# Patient Record
Sex: FEMALE | Race: WHITE | NOT HISPANIC OR LATINO | URBAN - METROPOLITAN AREA
[De-identification: names, ages, dates, MRNs, and addresses within clinical notes are randomized per-mention and may not be internally consistent; named-entity substitution may affect disease eponyms.]

---

## 2018-04-10 ENCOUNTER — INPATIENT (INPATIENT)
Facility: HOSPITAL | Age: 83
LOS: 2 days | Discharge: HOME | DRG: 191 | End: 2018-04-13
Attending: INTERNAL MEDICINE | Admitting: INTERNAL MEDICINE
Payer: MEDICARE

## 2018-04-10 VITALS
HEART RATE: 66 BPM | TEMPERATURE: 97 F | DIASTOLIC BLOOD PRESSURE: 53 MMHG | SYSTOLIC BLOOD PRESSURE: 74 MMHG | OXYGEN SATURATION: 96 % | HEIGHT: 62 IN | RESPIRATION RATE: 15 BRPM | WEIGHT: 139.99 LBS

## 2018-04-10 LAB
ALBUMIN SERPL ELPH-MCNC: 2.8 G/DL — LOW (ref 3.5–5)
ALP SERPL-CCNC: 52 U/L — SIGNIFICANT CHANGE UP (ref 40–120)
ALT FLD-CCNC: 25 U/L DA — SIGNIFICANT CHANGE UP (ref 10–60)
ANION GAP SERPL CALC-SCNC: 7 MMOL/L — SIGNIFICANT CHANGE UP (ref 5–17)
AST SERPL-CCNC: 21 U/L — SIGNIFICANT CHANGE UP (ref 10–40)
BASOPHILS # BLD AUTO: 0.1 K/UL — SIGNIFICANT CHANGE UP (ref 0–0.2)
BASOPHILS NFR BLD AUTO: 1.1 % — SIGNIFICANT CHANGE UP (ref 0–2)
BILIRUB SERPL-MCNC: 0.3 MG/DL — SIGNIFICANT CHANGE UP (ref 0.2–1.2)
BUN SERPL-MCNC: 26 MG/DL — HIGH (ref 7–18)
CALCIUM SERPL-MCNC: 8.3 MG/DL — LOW (ref 8.4–10.5)
CHLORIDE SERPL-SCNC: 106 MMOL/L — SIGNIFICANT CHANGE UP (ref 96–108)
CO2 SERPL-SCNC: 25 MMOL/L — SIGNIFICANT CHANGE UP (ref 22–31)
CREAT SERPL-MCNC: 1.54 MG/DL — HIGH (ref 0.5–1.3)
EOSINOPHIL # BLD AUTO: 0.1 K/UL — SIGNIFICANT CHANGE UP (ref 0–0.5)
EOSINOPHIL NFR BLD AUTO: 1.5 % — SIGNIFICANT CHANGE UP (ref 0–6)
GLUCOSE SERPL-MCNC: 96 MG/DL — SIGNIFICANT CHANGE UP (ref 70–99)
HCT VFR BLD CALC: 38.7 % — SIGNIFICANT CHANGE UP (ref 34.5–45)
HGB BLD-MCNC: 12.1 G/DL — SIGNIFICANT CHANGE UP (ref 11.5–15.5)
LYMPHOCYTES # BLD AUTO: 1.6 K/UL — SIGNIFICANT CHANGE UP (ref 1–3.3)
LYMPHOCYTES # BLD AUTO: 17.6 % — SIGNIFICANT CHANGE UP (ref 13–44)
MCHC RBC-ENTMCNC: 30.4 PG — SIGNIFICANT CHANGE UP (ref 27–34)
MCHC RBC-ENTMCNC: 31.2 GM/DL — LOW (ref 32–36)
MCV RBC AUTO: 97.4 FL — SIGNIFICANT CHANGE UP (ref 80–100)
MONOCYTES # BLD AUTO: 0.6 K/UL — SIGNIFICANT CHANGE UP (ref 0–0.9)
MONOCYTES NFR BLD AUTO: 6.4 % — SIGNIFICANT CHANGE UP (ref 2–14)
NEUTROPHILS # BLD AUTO: 6.5 K/UL — SIGNIFICANT CHANGE UP (ref 1.8–7.4)
NEUTROPHILS NFR BLD AUTO: 73.3 % — SIGNIFICANT CHANGE UP (ref 43–77)
PLATELET # BLD AUTO: 185 K/UL — SIGNIFICANT CHANGE UP (ref 150–400)
POTASSIUM SERPL-MCNC: 5 MMOL/L — SIGNIFICANT CHANGE UP (ref 3.5–5.3)
POTASSIUM SERPL-SCNC: 5 MMOL/L — SIGNIFICANT CHANGE UP (ref 3.5–5.3)
PROT SERPL-MCNC: 6.1 G/DL — SIGNIFICANT CHANGE UP (ref 6–8.3)
RBC # BLD: 3.98 M/UL — SIGNIFICANT CHANGE UP (ref 3.8–5.2)
RBC # FLD: 15.5 % — HIGH (ref 10.3–14.5)
SODIUM SERPL-SCNC: 138 MMOL/L — SIGNIFICANT CHANGE UP (ref 135–145)
TROPONIN I SERPL-MCNC: <0.015 NG/ML — SIGNIFICANT CHANGE UP (ref 0–0.04)
WBC # BLD: 8.8 K/UL — SIGNIFICANT CHANGE UP (ref 3.8–10.5)
WBC # FLD AUTO: 8.8 K/UL — SIGNIFICANT CHANGE UP (ref 3.8–10.5)

## 2018-04-10 PROCEDURE — 70450 CT HEAD/BRAIN W/O DYE: CPT | Mod: 26

## 2018-04-10 RX ORDER — IPRATROPIUM/ALBUTEROL SULFATE 18-103MCG
3 AEROSOL WITH ADAPTER (GRAM) INHALATION ONCE
Qty: 0 | Refills: 0 | Status: COMPLETED | OUTPATIENT
Start: 2018-04-10 | End: 2018-04-10

## 2018-04-10 RX ORDER — SODIUM CHLORIDE 9 MG/ML
1000 INJECTION INTRAMUSCULAR; INTRAVENOUS; SUBCUTANEOUS ONCE
Qty: 0 | Refills: 0 | Status: COMPLETED | OUTPATIENT
Start: 2018-04-10 | End: 2018-04-10

## 2018-04-10 RX ADMIN — Medication 3 MILLILITER(S): at 23:46

## 2018-04-10 RX ADMIN — SODIUM CHLORIDE 1000 MILLILITER(S): 9 INJECTION INTRAMUSCULAR; INTRAVENOUS; SUBCUTANEOUS at 22:06

## 2018-04-10 NOTE — ED PROVIDER NOTE - MEDICAL DECISION MAKING DETAILS
89 y/o F pt presents from Opthalmologist for syncope and low bp s/p right eye beta blocker eye drop; will get CT brain, EKG, blood work, IV fluid hydration and reassess

## 2018-04-10 NOTE — ED PROVIDER NOTE - OBJECTIVE STATEMENT
89 y/o F pt w/ significant PMHx of COPD and significant PSHx of cataract surgery presents to ED s/p episode of unresponsiveness x earlier today. Pt's family reports that pt was at the retinologist office and became unresponsive after cortisone injection to right eye. Per pt family, pt was given eye drops containing beta blockers due to increasing eye pressure s/p injection. Pt's bp was measured at the retinologist office and was noted to be low. Pt's family also reports pt exhibited shallow breathing and labored respiration. EMS was called and per pt nursing aid, pt was given oxygen that appears to have helped sxs. Pt's nursing aid also reports pt had difficulty lifting right leg earlier today x 2:15 PM and returned to normal at 3:00PM. Pt's nursing aid reports a near fall today but was caught by the nursing aid. Pt's family also reports mild stomach discomfort that the family attributes to celebrex use. Pt denies fever, chills, lethargy, diarrhea, vomiting, localized pain, or any other complaints.

## 2018-04-10 NOTE — ED PROVIDER NOTE - CHPI ED SYMPTOMS POS
SHORTNESS OF BREATH/low BP, unresponsiveness, increased right eye pressure, right eye dilation, shallowed and labored breathing, difficulty moving right leg,

## 2018-04-10 NOTE — ED ADULT TRIAGE NOTE - CHIEF COMPLAINT QUOTE
BIBA from ophthalmologist office with c/o syncopal episode . As per EMS patient was sitting on the chair after getting eye drops when syncope occur. Denies fall, denies injury.

## 2018-04-10 NOTE — ED PROVIDER NOTE - PROGRESS NOTE DETAILS
Pt with syncope and low BP but gradually improved here with IVF.  Suspect likely vasovagal episode.  However Pt also with intermittent shortness of breath recently. Pt with syncope and low BP but gradually improved here with IVF.  Suspect likely vasovagal episode.  However Pt also with intermittent shortness of breath recently, and while here in ED her pulse oximetry decreased consistently into mid-80's% on room air.  She required nasal cannula O2 at 2-3 L to maintain pulse ox above the 90% range.  Exam revealed no wheezing or respiratory distress, no chest pain.  Pt does use nebulizer treatments at home but does not have home oxygen.  -Informed Dr. DEVEN Dove for admission, unattached as she currently has no PMD. Pt's additional medications include:   Gabapentin 300 mg BID  Tramadol/APAP 37.5/325 BID  Prednisone 5 mg daily

## 2018-04-11 DIAGNOSIS — J44.1 CHRONIC OBSTRUCTIVE PULMONARY DISEASE WITH (ACUTE) EXACERBATION: ICD-10-CM

## 2018-04-11 DIAGNOSIS — Z98.49 CATARACT EXTRACTION STATUS, UNSPECIFIED EYE: Chronic | ICD-10-CM

## 2018-04-11 DIAGNOSIS — N17.9 ACUTE KIDNEY FAILURE, UNSPECIFIED: ICD-10-CM

## 2018-04-11 DIAGNOSIS — R55 SYNCOPE AND COLLAPSE: ICD-10-CM

## 2018-04-11 DIAGNOSIS — Z29.9 ENCOUNTER FOR PROPHYLACTIC MEASURES, UNSPECIFIED: ICD-10-CM

## 2018-04-11 LAB
ANION GAP SERPL CALC-SCNC: 6 MMOL/L — SIGNIFICANT CHANGE UP (ref 5–17)
BUN SERPL-MCNC: 20 MG/DL — HIGH (ref 7–18)
CALCIUM SERPL-MCNC: 8.8 MG/DL — SIGNIFICANT CHANGE UP (ref 8.4–10.5)
CHLORIDE SERPL-SCNC: 110 MMOL/L — HIGH (ref 96–108)
CO2 SERPL-SCNC: 28 MMOL/L — SIGNIFICANT CHANGE UP (ref 22–31)
CREAT SERPL-MCNC: 1.04 MG/DL — SIGNIFICANT CHANGE UP (ref 0.5–1.3)
GLUCOSE SERPL-MCNC: 90 MG/DL — SIGNIFICANT CHANGE UP (ref 70–99)
MAGNESIUM SERPL-MCNC: 2.7 MG/DL — HIGH (ref 1.6–2.6)
PHOSPHATE SERPL-MCNC: 3 MG/DL — SIGNIFICANT CHANGE UP (ref 2.5–4.5)
POTASSIUM SERPL-MCNC: 4.1 MMOL/L — SIGNIFICANT CHANGE UP (ref 3.5–5.3)
POTASSIUM SERPL-SCNC: 4.1 MMOL/L — SIGNIFICANT CHANGE UP (ref 3.5–5.3)
SODIUM SERPL-SCNC: 144 MMOL/L — SIGNIFICANT CHANGE UP (ref 135–145)

## 2018-04-11 PROCEDURE — 99222 1ST HOSP IP/OBS MODERATE 55: CPT

## 2018-04-11 PROCEDURE — 99285 EMERGENCY DEPT VISIT HI MDM: CPT | Mod: 25

## 2018-04-11 PROCEDURE — 71045 X-RAY EXAM CHEST 1 VIEW: CPT | Mod: 26

## 2018-04-11 PROCEDURE — 93880 EXTRACRANIAL BILAT STUDY: CPT | Mod: 26

## 2018-04-11 RX ORDER — ALBUTEROL 90 UG/1
0.5 AEROSOL, METERED ORAL
Qty: 0 | Refills: 0 | COMMUNITY

## 2018-04-11 RX ORDER — DOCUSATE SODIUM 100 MG
100 CAPSULE ORAL THREE TIMES A DAY
Qty: 0 | Refills: 0 | Status: DISCONTINUED | OUTPATIENT
Start: 2018-04-11 | End: 2018-04-13

## 2018-04-11 RX ORDER — SODIUM CHLORIDE 9 MG/ML
1000 INJECTION INTRAMUSCULAR; INTRAVENOUS; SUBCUTANEOUS
Qty: 0 | Refills: 0 | Status: DISCONTINUED | OUTPATIENT
Start: 2018-04-11 | End: 2018-04-11

## 2018-04-11 RX ORDER — SODIUM CHLORIDE 9 MG/ML
1000 INJECTION INTRAMUSCULAR; INTRAVENOUS; SUBCUTANEOUS
Qty: 0 | Refills: 0 | Status: COMPLETED | OUTPATIENT
Start: 2018-04-11 | End: 2018-04-11

## 2018-04-11 RX ORDER — GABAPENTIN 400 MG/1
1 CAPSULE ORAL
Qty: 0 | Refills: 0 | COMMUNITY

## 2018-04-11 RX ORDER — IPRATROPIUM/ALBUTEROL SULFATE 18-103MCG
3 AEROSOL WITH ADAPTER (GRAM) INHALATION ONCE
Qty: 0 | Refills: 0 | Status: COMPLETED | OUTPATIENT
Start: 2018-04-11 | End: 2018-04-11

## 2018-04-11 RX ORDER — METHOTREXATE 2.5 MG/1
1 TABLET ORAL
Qty: 0 | Refills: 0 | COMMUNITY

## 2018-04-11 RX ORDER — BIMATOPROST 0.3 MG/ML
1 SOLUTION/ DROPS OPHTHALMIC
Qty: 0 | Refills: 0 | COMMUNITY

## 2018-04-11 RX ORDER — GABAPENTIN 400 MG/1
300 CAPSULE ORAL
Qty: 0 | Refills: 0 | Status: DISCONTINUED | OUTPATIENT
Start: 2018-04-11 | End: 2018-04-13

## 2018-04-11 RX ORDER — SENNA PLUS 8.6 MG/1
2 TABLET ORAL AT BEDTIME
Qty: 0 | Refills: 0 | Status: DISCONTINUED | OUTPATIENT
Start: 2018-04-11 | End: 2018-04-13

## 2018-04-11 RX ORDER — LATANOPROST 0.05 MG/ML
1 SOLUTION/ DROPS OPHTHALMIC; TOPICAL AT BEDTIME
Qty: 0 | Refills: 0 | Status: DISCONTINUED | OUTPATIENT
Start: 2018-04-11 | End: 2018-04-13

## 2018-04-11 RX ORDER — HEPARIN SODIUM 5000 [USP'U]/ML
5000 INJECTION INTRAVENOUS; SUBCUTANEOUS EVERY 8 HOURS
Qty: 0 | Refills: 0 | Status: DISCONTINUED | OUTPATIENT
Start: 2018-04-11 | End: 2018-04-13

## 2018-04-11 RX ORDER — SERTRALINE 25 MG/1
150 TABLET, FILM COATED ORAL DAILY
Qty: 0 | Refills: 0 | Status: DISCONTINUED | OUTPATIENT
Start: 2018-04-11 | End: 2018-04-13

## 2018-04-11 RX ORDER — TRAMADOL HYDROCHLORIDE 50 MG/1
25 TABLET ORAL ONCE
Qty: 0 | Refills: 0 | Status: DISCONTINUED | OUTPATIENT
Start: 2018-04-11 | End: 2018-04-11

## 2018-04-11 RX ORDER — UMECLIDINIUM BROMIDE AND VILANTEROL TRIFENATATE 62.5; 25 UG/1; UG/1
1 POWDER RESPIRATORY (INHALATION)
Qty: 0 | Refills: 0 | COMMUNITY

## 2018-04-11 RX ORDER — CELECOXIB 200 MG/1
2 CAPSULE ORAL
Qty: 0 | Refills: 0 | COMMUNITY

## 2018-04-11 RX ORDER — SERTRALINE 25 MG/1
150 TABLET, FILM COATED ORAL
Qty: 0 | Refills: 0 | COMMUNITY

## 2018-04-11 RX ORDER — TRAMADOL HYDROCHLORIDE 50 MG/1
25 TABLET ORAL
Qty: 0 | Refills: 0 | Status: DISCONTINUED | OUTPATIENT
Start: 2018-04-11 | End: 2018-04-13

## 2018-04-11 RX ORDER — METHOTREXATE 2.5 MG/1
15 TABLET ORAL
Qty: 0 | Refills: 0 | Status: COMPLETED | OUTPATIENT
Start: 2018-04-11 | End: 2018-04-12

## 2018-04-11 RX ORDER — IPRATROPIUM/ALBUTEROL SULFATE 18-103MCG
3 AEROSOL WITH ADAPTER (GRAM) INHALATION EVERY 6 HOURS
Qty: 0 | Refills: 0 | Status: DISCONTINUED | OUTPATIENT
Start: 2018-04-11 | End: 2018-04-13

## 2018-04-11 RX ADMIN — GABAPENTIN 300 MILLIGRAM(S): 400 CAPSULE ORAL at 21:24

## 2018-04-11 RX ADMIN — HEPARIN SODIUM 5000 UNIT(S): 5000 INJECTION INTRAVENOUS; SUBCUTANEOUS at 21:24

## 2018-04-11 RX ADMIN — SENNA PLUS 2 TABLET(S): 8.6 TABLET ORAL at 21:24

## 2018-04-11 RX ADMIN — Medication 3 MILLILITER(S): at 01:25

## 2018-04-11 RX ADMIN — Medication 100 MILLIGRAM(S): at 05:07

## 2018-04-11 RX ADMIN — Medication 30 MILLILITER(S): at 09:47

## 2018-04-11 RX ADMIN — Medication 100 MILLIGRAM(S): at 21:24

## 2018-04-11 RX ADMIN — TRAMADOL HYDROCHLORIDE 25 MILLIGRAM(S): 50 TABLET ORAL at 10:56

## 2018-04-11 RX ADMIN — SODIUM CHLORIDE 75 MILLILITER(S): 9 INJECTION INTRAMUSCULAR; INTRAVENOUS; SUBCUTANEOUS at 08:04

## 2018-04-11 RX ADMIN — Medication 125 MILLIGRAM(S): at 01:05

## 2018-04-11 RX ADMIN — Medication 30 MILLILITER(S): at 16:06

## 2018-04-11 RX ADMIN — HEPARIN SODIUM 5000 UNIT(S): 5000 INJECTION INTRAVENOUS; SUBCUTANEOUS at 14:23

## 2018-04-11 RX ADMIN — SERTRALINE 150 MILLIGRAM(S): 25 TABLET, FILM COATED ORAL at 11:05

## 2018-04-11 RX ADMIN — LATANOPROST 1 DROP(S): 0.05 SOLUTION/ DROPS OPHTHALMIC; TOPICAL at 21:24

## 2018-04-11 RX ADMIN — SODIUM CHLORIDE 75 MILLILITER(S): 9 INJECTION INTRAMUSCULAR; INTRAVENOUS; SUBCUTANEOUS at 05:07

## 2018-04-11 RX ADMIN — HEPARIN SODIUM 5000 UNIT(S): 5000 INJECTION INTRAVENOUS; SUBCUTANEOUS at 05:07

## 2018-04-11 RX ADMIN — TRAMADOL HYDROCHLORIDE 25 MILLIGRAM(S): 50 TABLET ORAL at 11:21

## 2018-04-11 NOTE — ED ADULT NURSE REASSESSMENT NOTE - NS ED NURSE REASSESS COMMENT FT1
Patient remains hemodynamically stable and in no acute distress, able to speak in full sentences with no medical complaints.  Family member by bedside.

## 2018-04-11 NOTE — CONSULT NOTE ADULT - SUBJECTIVE AND OBJECTIVE BOX
CHIEF COMPLAINT: Patient is a 89y old  Female who presents with a chief complaint of syncope (11 Apr 2018 03:02)      HPI:  Patient is a 89 yo female from home with PMH of COPD and PSH of cataract surgery presents to ED s/p one episode of unresponsiveness for couple minutes yesterday. Pt's family reports that pt was at the Retinologist office for macular degeneration and became unresponsive after cortisone injection to right eye. Pt was given eye drops containing beta blockers due to increasing eye pressure s/p injection. Pt denied any dizziness, room spinning sensation or falls. Pt's BP was measured at the Retinologist office and was noted to be low. Pt's family also reports pt exhibited shallow breathing and labored respiration. EMS was called and per pt nursing aid, pt was given oxygen that appears to have helped. Pt's nursing aid reports a near fall yesterday at home but was caught by the nursing aid. Pt denies any fever, chills, lethargy, headache, chest pain, palpitation, abd pain , diarrhea, nausea,  vomiting,  or any other complaints.    In ER, vitals wnl except BP 74/53mmHg, given 1 liter NS bolus, BP up to 112/68, s/p Duoneb x2 , solumedrol 125 mg IV in ER. Labs wnl except BUN / Cr 26/1.54, started NS IV fluid. EKG NSR , low voltage QRS, no ST or T wave changes. CT head No acute intracranial hemorrhage, mass effect, or evidence of acute vascular territorial infarction. CXR unremarkable, will f/u official report. (11 Apr 2018 03:02)   Patient seen and examined.     PAST MEDICAL & SURGICAL HISTORY:  COPD (chronic obstructive pulmonary disease)  History of cataract surgery      Allergies    No Known Allergies    Intolerances        MEDICATIONS  (STANDING):  docusate sodium 100 milliGRAM(s) Oral three times a day  gabapentin 300 milliGRAM(s) Oral two times a day  heparin  Injectable 5000 Unit(s) SubCutaneous every 8 hours  latanoprost 0.005% Ophthalmic Solution 1 Drop(s) Both EYES at bedtime  methotrexate 15 milliGRAM(s) Oral <User Schedule>  predniSONE   Tablet 5 milliGRAM(s) Oral daily  senna 2 Tablet(s) Oral at bedtime  sertraline 150 milliGRAM(s) Oral daily  traMADol 25 milliGRAM(s) Oral two times a day      MEDICATIONS  (PRN):  ALBUTerol/ipratropium for Nebulization 3 milliLiter(s) Nebulizer every 6 hours PRN Shortness of Breath  aluminum hydroxide/magnesium hydroxide/simethicone Suspension 30 milliLiter(s) Oral every 6 hours PRN Dyspepsia   Medications up to date at time of exam.    FAMILY HISTORY:  No pertinent family history in first degree relatives      SOCIAL HISTORY  Smoking History: [   ] smoking/smoke exposure, [ x  ] former smoker 1 PPD x 40 years, quit 20 years ago, [  ] denies smoking  Living Condition: [   ] apartment, [   ] private house  Work History:   Travel History: denies recent travel  Illicit Substance Use: denies  Alcohol Use: denies    REVIEW OF SYSTEMS:    CONSTITUTIONAL:  denies fevers, chills, sweats, weight loss    HEENT:  denies diplopia or blurred vision, sore throat or runny nose.    CARDIOVASCULAR:  denies pressure, squeezing, tightness, or heaviness about the chest; no palpitations.    RESPIRATORY:  denies SOB, cough, STUBBS, wheezing.    GASTROINTESTINAL:  denies abdominal pain, nausea, vomiting or diarrhea.    GENITOURINARY: denies dysuria, frequency or urgency.    NEUROLOGIC:  denies numbness, tingling, seizures or weakness.    PSYCHIATRIC:  denies disorder of thought or mood.    MSK: denies swelling, redness      PHYSICAL EXAMINATION:    GENERAL: The patient is a well-developed, well-nourished, in no apparent distress.     Vital Signs Last 24 Hrs  T(C): 36.6 (11 Apr 2018 14:33), Max: 37.1 (11 Apr 2018 12:29)  T(F): 97.8 (11 Apr 2018 14:33), Max: 98.7 (11 Apr 2018 12:29)  HR: 82 (11 Apr 2018 14:33) (62 - 97)  BP: 155/81 (11 Apr 2018 14:33) (74/53 - 198/111)  BP(mean): --  RR: 16 (11 Apr 2018 14:33) (15 - 16)  SpO2: 92% (11 Apr 2018 14:33) (92% - 97%)   (if applicable)    Chest Tube (if applicable)    HEENT: Head is normocephalic and atraumatic. .    NECK: Supple, no palpable adenopathy.    LUNGS: Clear to auscultation, no wheezing, rales, or rhonchi.    HEART: Regular rate and rhythm without murmur.    ABDOMEN: Soft, nontender, and nondistended.  No hepatosplenomegaly is noted.    EXTREMITIES: Without any cyanosis, clubbing, rash, lesions or edema.    NEUROLOGIC: Awake, alert.    SKIN: Warm, dry, good turgor.      LABS:                        12.1   8.8   )-----------( 185      ( 10 Apr 2018 21:18 )             38.7     04-11    144  |  110<H>  |  20<H>  ----------------------------<  90  4.1   |  28  |  1.04    Ca    8.8      11 Apr 2018 16:04  Phos  3.0     04-11  Mg     2.7     04-11    TPro  6.1  /  Alb  2.8<L>  /  TBili  0.3  /  DBili  x   /  AST  21  /  ALT  25  /  AlkPhos  52  04-10          CARDIAC MARKERS ( 10 Apr 2018 21:18 )  <0.015 ng/mL / x     / x     / x     / x                    MICROBIOLOGY: (if applicable)    RADIOLOGY & ADDITIONAL STUDIES:  EKG:   CXR:  ECHO:    IMPRESSION: 89y Female PAST MEDICAL & SURGICAL HISTORY:  COPD (chronic obstructive pulmonary disease)  History of cataract surgery       p/w SOB, unresponsiveness from retinologist office after injection of xalantan into eye.  As per daughter Dora, patient was told she has emphysema and recently pt desat to 79% w/ physical therapy.     + [facility]    RECOMMENDATIONS:     - con't with bronchodilators, steroids, o2 supplement as needed.    - patient may need home o2, document o2 sat w/ and w/out o2, at rest and activity.   - neuro f/u    - DVT and GI prophylaxis.    - plan of care discussed with daughter dora at bedside CHIEF COMPLAINT: Patient is a 89y old  Female who presents with a chief complaint of syncope (11 Apr 2018 03:02)      HPI:  Patient is a 89 yo female from home with PMH of COPD and PSH of cataract surgery presents to ED s/p one episode of unresponsiveness for couple minutes yesterday. Pt's family reports that pt was at the Retinologist office for macular degeneration and became unresponsive after cortisone injection to right eye. Pt was given eye drops containing beta blockers due to increasing eye pressure s/p injection. Pt denied any dizziness, room spinning sensation or falls. Pt's BP was measured at the Retinologist office and was noted to be low. Pt's family also reports pt exhibited shallow breathing and labored respiration. EMS was called and per pt nursing aid, pt was given oxygen that appears to have helped. Pt's nursing aid reports a near fall yesterday at home but was caught by the nursing aid. Pt denies any fever, chills, lethargy, headache, chest pain, palpitation, abd pain , diarrhea, nausea,  vomiting,  or any other complaints.    In ER, vitals wnl except BP 74/53mmHg, given 1 liter NS bolus, BP up to 112/68, s/p Duoneb x2 , solumedrol 125 mg IV in ER. Labs wnl except BUN / Cr 26/1.54, started NS IV fluid. EKG NSR , low voltage QRS, no ST or T wave changes. CT head No acute intracranial hemorrhage, mass effect, or evidence of acute vascular territorial infarction. CXR unremarkable, will f/u official report. (11 Apr 2018 03:02)   Patient seen and examined.     PAST MEDICAL & SURGICAL HISTORY:  COPD (chronic obstructive pulmonary disease)  History of cataract surgery      Allergies    No Known Allergies    Intolerances        MEDICATIONS  (STANDING):  docusate sodium 100 milliGRAM(s) Oral three times a day  gabapentin 300 milliGRAM(s) Oral two times a day  heparin  Injectable 5000 Unit(s) SubCutaneous every 8 hours  latanoprost 0.005% Ophthalmic Solution 1 Drop(s) Both EYES at bedtime  methotrexate 15 milliGRAM(s) Oral <User Schedule>  predniSONE   Tablet 5 milliGRAM(s) Oral daily  senna 2 Tablet(s) Oral at bedtime  sertraline 150 milliGRAM(s) Oral daily  traMADol 25 milliGRAM(s) Oral two times a day      MEDICATIONS  (PRN):  ALBUTerol/ipratropium for Nebulization 3 milliLiter(s) Nebulizer every 6 hours PRN Shortness of Breath  aluminum hydroxide/magnesium hydroxide/simethicone Suspension 30 milliLiter(s) Oral every 6 hours PRN Dyspepsia   Medications up to date at time of exam.    FAMILY HISTORY:  No pertinent family history in first degree relatives      SOCIAL HISTORY  Smoking History: [   ] smoking/smoke exposure, [ x  ] former smoker 1 PPD x 40 years, quit 20 years ago, [  ] denies smoking  Living Condition: [   ] apartment, [   ] private house  Work History:   Travel History: denies recent travel  Illicit Substance Use: denies  Alcohol Use: denies    REVIEW OF SYSTEMS:    CONSTITUTIONAL:  denies fevers, chills, sweats, weight loss    HEENT:  denies diplopia or blurred vision, sore throat or runny nose.    CARDIOVASCULAR:  denies pressure, squeezing, tightness, or heaviness about the chest; no palpitations.    RESPIRATORY:  denies SOB, cough, STUBBS, wheezing.    GASTROINTESTINAL:  denies abdominal pain, nausea, vomiting or diarrhea.    GENITOURINARY: denies dysuria, frequency or urgency.    NEUROLOGIC:  denies numbness, tingling, seizures or weakness.    PSYCHIATRIC:  denies disorder of thought or mood.    MSK: denies swelling, redness      PHYSICAL EXAMINATION:    GENERAL: The patient is a well-developed, well-nourished, in no apparent distress.     Vital Signs Last 24 Hrs  T(C): 36.6 (11 Apr 2018 14:33), Max: 37.1 (11 Apr 2018 12:29)  T(F): 97.8 (11 Apr 2018 14:33), Max: 98.7 (11 Apr 2018 12:29)  HR: 82 (11 Apr 2018 14:33) (62 - 97)  BP: 155/81 (11 Apr 2018 14:33) (74/53 - 198/111)  BP(mean): --  RR: 16 (11 Apr 2018 14:33) (15 - 16)  SpO2: 92% (11 Apr 2018 14:33) (92% - 97%)   (if applicable)    Chest Tube (if applicable)    HEENT: Head is normocephalic and atraumatic. .    NECK: Supple, no palpable adenopathy.    LUNGS: Clear to auscultation, no wheezing, rales, or rhonchi.    HEART: Regular rate and rhythm without murmur.    ABDOMEN: Soft, nontender, and nondistended.  No hepatosplenomegaly is noted.    EXTREMITIES: Without any cyanosis, clubbing, rash, lesions or edema.    NEUROLOGIC: Awake, alert.    SKIN: Warm, dry, good turgor.      LABS:                        12.1   8.8   )-----------( 185      ( 10 Apr 2018 21:18 )             38.7     04-11    144  |  110<H>  |  20<H>  ----------------------------<  90  4.1   |  28  |  1.04    Ca    8.8      11 Apr 2018 16:04  Phos  3.0     04-11  Mg     2.7     04-11    TPro  6.1  /  Alb  2.8<L>  /  TBili  0.3  /  DBili  x   /  AST  21  /  ALT  25  /  AlkPhos  52  04-10          CARDIAC MARKERS ( 10 Apr 2018 21:18 )  <0.015 ng/mL / x     / x     / x     / x                    MICROBIOLOGY: (if applicable)    RADIOLOGY & ADDITIONAL STUDIES:  EKG:   CXR:  ECHO:    IMPRESSION: 89y Female PAST MEDICAL & SURGICAL HISTORY:  COPD (chronic obstructive pulmonary disease)  History of cataract surgery       p/w SOB, unresponsiveness from retinologist office after injection of xalantan into eye.  As per daughter Dora, patient was told she has emphysema and recently pt desat to 79% w/ physical therapy.     + [facility]    RECOMMENDATIONS:     - con't with bronchodilators, steroids, o2 supplement as needed.    - patient may need home o2, document o2 sat w/ and w/out o2, at rest and activity.   - neuro f/u    - DVT and GI prophylaxis.    - plan of care discussed with sebastián turcios at bedside  Agree with above assessment and plan as transcribed.

## 2018-04-11 NOTE — CONSULT NOTE ADULT - ATTENDING COMMENTS
Thank you for the courtesy of the consultation,I would be available for any further discussion if needed.  Juancho Estrella MD,FACC.  [address] [place], [place] [id number] [phone]

## 2018-04-11 NOTE — CONSULT NOTE ADULT - ASSESSMENT
Syncope LOC concerning for syncope, unlikely to be neurological.  Eval for syncope per primary team.  Will recommend MRI brain and EEG to r/o stroke/ seizures as patient has chronic word finding difficulty and dragging her right leg.  Please call back if these tests are abnormal.

## 2018-04-11 NOTE — PHYSICAL THERAPY INITIAL EVALUATION ADULT - DIAGNOSIS, PT EVAL
Patient presented with decreased endurance, impaired balance, decreased spo2 and was unsteady with transfers and ambulation

## 2018-04-11 NOTE — H&P ADULT - PROBLEM SELECTOR PLAN 3
former smoker, pt take albuterol inhaler at home as needed, pt had SOB/labored breathing as per family  s/p Duoneb x2 , solumedrol 125 mg IV in ER former smoker, pt take albuterol inhaler at home as needed, pt had SOB/labored breathing as per family  s/p Duoneb x2 , solumedrol 125 mg IV in ER  continue o2 , nebs PRN

## 2018-04-11 NOTE — H&P ADULT - NEUROLOGICAL DETAILS
sensation intact/responds to pain/normal strength/responds to verbal commands/alert and oriented x 3

## 2018-04-11 NOTE — H&P ADULT - PROBLEM SELECTOR PLAN 2
BUN / Cr 26/1.54, unknown baseline , likely 2/2 dehydration vs hypotension    started NS IV fluid at 75cc  s/p NS 1 liter  f/u urine lytes  monitor BMP closely

## 2018-04-11 NOTE — H&P ADULT - HISTORY OF PRESENT ILLNESS
Patient is a 89 yo female from home with PMH of COPD and PSH of cataract surgery presents to ED s/p one episode of unresponsiveness for couple minutes yesterday. Pt's family reports that pt was at the Retinologist office and became unresponsive after cortisone injection to right eye. Pt was given eye drops containing beta blockers due to increasing eye pressure s/p injection. Pt denied any dizziness, room spinning sensation. Pt's BP was measured at the Retinologist office and was noted to be low. Pt's family also reports pt exhibited shallow breathing and labored respiration. EMS was called and per pt nursing aid, pt was given oxygen that appears to have helped. Pt's nursing aid reports a near fall yesterday at home but was caught by the nursing aid. Pt denies any fever, chills, lethargy, headache, chest pain, palpitation, abd pain , diarrhea, nausea,  vomiting,  or any other complaints.    In ER, vitals wnl except BP 74/53mmHg, given 1 liter NS bolus, BP up to 112/68, s/p Duoneb x2 , solumedrol 125 mg IV in ER. Labs wnl except BUN / Cr 26/1.54, started NS IV fluid. EKG NSR , low voltage QRS, no ST or T wave changes. CT head No acute intracranial hemorrhage, mass effect, or evidence of acute vascular territorial infarction. CXR unremarkable, will f/u official report. Patient is a 89 yo female from home with PMH of COPD and PSH of cataract surgery presents to ED s/p one episode of unresponsiveness for couple minutes yesterday. Pt's family reports that pt was at the Retinologist office and became unresponsive after cortisone injection to right eye. Pt was given eye drops containing beta blockers due to increasing eye pressure s/p injection. Pt denied any dizziness, room spinning sensation or falls. Pt's BP was measured at the Retinologist office and was noted to be low. Pt's family also reports pt exhibited shallow breathing and labored respiration. EMS was called and per pt nursing aid, pt was given oxygen that appears to have helped. Pt's nursing aid reports a near fall yesterday at home but was caught by the nursing aid. Pt denies any fever, chills, lethargy, headache, chest pain, palpitation, abd pain , diarrhea, nausea,  vomiting,  or any other complaints.    In ER, vitals wnl except BP 74/53mmHg, given 1 liter NS bolus, BP up to 112/68, s/p Duoneb x2 , solumedrol 125 mg IV in ER. Labs wnl except BUN / Cr 26/1.54, started NS IV fluid. EKG NSR , low voltage QRS, no ST or T wave changes. CT head No acute intracranial hemorrhage, mass effect, or evidence of acute vascular territorial infarction. CXR unremarkable, will f/u official report. Patient is a 89 yo female from home with PMH of COPD and PSH of cataract surgery presents to ED s/p one episode of unresponsiveness for couple minutes yesterday. Pt's family reports that pt was at the Retinologist office for macular degeneration and became unresponsive after cortisone injection to right eye. Pt was given eye drops containing beta blockers due to increasing eye pressure s/p injection. Pt denied any dizziness, room spinning sensation or falls. Pt's BP was measured at the Retinologist office and was noted to be low. Pt's family also reports pt exhibited shallow breathing and labored respiration. EMS was called and per pt nursing aid, pt was given oxygen that appears to have helped. Pt's nursing aid reports a near fall yesterday at home but was caught by the nursing aid. Pt denies any fever, chills, lethargy, headache, chest pain, palpitation, abd pain , diarrhea, nausea,  vomiting,  or any other complaints.    In ER, vitals wnl except BP 74/53mmHg, given 1 liter NS bolus, BP up to 112/68, s/p Duoneb x2 , solumedrol 125 mg IV in ER. Labs wnl except BUN / Cr 26/1.54, started NS IV fluid. EKG NSR , low voltage QRS, no ST or T wave changes. CT head No acute intracranial hemorrhage, mass effect, or evidence of acute vascular territorial infarction. CXR unremarkable, will f/u official report.

## 2018-04-11 NOTE — CONSULT NOTE ADULT - SUBJECTIVE AND OBJECTIVE BOX
CHIEF COMPLAINT:Syncope    HPI:-Patient is a 89 yo female followed in office for many years, from home with PMH of COPD,Arthritis and PSH of cataract surgery presents to ED s/p one episode of unresponsiveness for couple minutes yesterday. Pt's family reports that pt was at the Retinologist office for macular degeneration and became unresponsive after cortisone injection to right eye. Pt was given eye drops containing beta blockers due to increasing eye pressure s/p injection. Pt denied any dizziness, room spinning sensation or falls. Pt's BP was measured at the Retinologist office and was noted to be low. Pt's family also reports pt exhibited shallow breathing and labored respiration. EMS was called and per pt nursing aid, pt was given oxygen that appears to have helped. Pt's nursing aid reports a near fall yesterday at home but was caught by the nursing aid. Pt denies any fever, chills, lethargy, headache, chest pain, palpitation, abd pain , diarrhea, nausea,  vomiting,  or any other complaints.    In ER, vitals wnl except BP 74/53mmHg, given 1 liter NS bolus, BP up to 112/68, s/p Duoneb x2 , solumedrol 125 mg IV in ER. Labs wnl except BUN / Cr 26/1.54, started NS IV fluid. EKG NSR , low voltage QRS, no ST or T wave changes. CT head No acute intracranial hemorrhage, mass effect, or evidence of acute vascular territorial infarction. CXR unremarkable,she feels better no chest pain no arrhythmias noted,refers to falls at home.          PAST MEDICAL & SURGICAL HISTORY:  COPD (chronic obstructive pulmonary disease)  History of cataract surgery      MEDICATIONS  (STANDING):  docusate sodium 100 milliGRAM(s) Oral three times a day  gabapentin 300 milliGRAM(s) Oral two times a day  heparin  Injectable 5000 Unit(s) SubCutaneous every 8 hours  predniSONE   Tablet 5 milliGRAM(s) Oral daily  senna 2 Tablet(s) Oral at bedtime  sertraline 150 milliGRAM(s) Oral daily    MEDICATIONS  (PRN):  ALBUTerol/ipratropium for Nebulization 3 milliLiter(s) Nebulizer every 6 hours PRN Shortness of Breath  aluminum hydroxide/magnesium hydroxide/simethicone Suspension 30 milliLiter(s) Oral every 6 hours PRN Dyspepsia      FAMILY HISTORY:  No pertinent family history in first degree relatives  No family history of premature coronary artery disease or sudden cardiac death    SOCIAL HISTORY:  Smoking-Former smoker  Alcohol-Denies  Ilicit Drug use-Denies    REVIEW OF SYSTEMS:  Constitutional: [ ] fever, [ ]weight loss, [ ]fatigue Activity [ ] Bedbound,[x ] Ambulates [ ] Unassisted[x ] Cane/Walker [ ] Assistence.  Eyes: [ ] visual changes  Respiratory: [x ]shortness of breath;  [x ] cough, [ ]wheezing, [ ]chills, [ ]hemoptysis  Cardiovascular: [ ] chest pain, [ ]palpitations, [ ]dizziness,  [ ]leg swelling[ ]orthopnea [ ]PND  Gastrointestinal: [ ] abdominal pain, [ ]nausea, [ ]vomiting,  [ ]diarrhea,[ ]constipation  Genitourinary: [ ] dysuria, [ ] hematuria  Neurologic: [ ] headaches [ ] tremors[ ] weakness  Skin: [ ] itching, [ ]burning, [ ] rashes  Endocrine: [ ] heat or cold intolerance  Musculoskeletal: [ ] joint pain or swelling; [x ] muscle, back, or extremity pain  Psychiatric: [ ] depression, [ ]anxiety, [ ]mood swings, or [ ]difficulty sleeping  Hematologic: [ ] easy bruising, [ ] bleeding gums       [ x] All others negative	  [ ] Unable to obtain    Vital Signs Last 24 Hrs  T(C): 36.3 (11 Apr 2018 07:46), Max: 36.9 (10 Apr 2018 23:31)  T(F): 97.3 (11 Apr 2018 07:46), Max: 98.4 (10 Apr 2018 23:31)  HR: 82 (11 Apr 2018 07:46) (62 - 82)  BP: 164/88 (11 Apr 2018 08:08) (74/53 - 177/89) ORTHOSTATIC-NEGATIVE  RR: 16 (11 Apr 2018 07:46) (15 - 16)  SpO2: 97% (11 Apr 2018 07:46) (94% - 97%)  I&O's Summary      PHYSICAL EXAM:  General: No acute distress BMI-25.6  HEENT: EOMI, PERRL[ ] Icteric  Neck: Supple, No JVD  Lungs: Fair air entry bilaterally; [ ] Rales [x ] Rhonchi [ ] Wheezing  Heart: Regular rate and rhythm;[x ] Murmurs-  2 /6 [x ] Systolic [ ] Diastolic [ ] Radiation,No rubs, or gallops  Abdomen: Nontender, bowel sounds present  Extremities: No clubbing, cyanosis, or edema[ ] Calf tenderness  Nervous system:  Alert & Oriented X3, no focal deficits  Psychiatric: Normal affect  Skin: No rashes or lesions      LABS:  04-10    138  |  106  |  26<H>  ----------------------------<  96  5.0   |  25  |  1.54<H>    Ca    8.3<L>      10 Apr 2018 21:18    TPro  6.1  /  Alb  2.8<L>  /  TBili  0.3  /  DBili  x   /  AST  21  /  ALT  25  /  AlkPhos  52  04-10    Creatinine Trend: 1.54<--                        12.1   8.8   )-----------( 185      ( 10 Apr 2018 21:18 )             38.7         Lipid Panel:   Cardiac Enzymes: CARDIAC MARKERS ( 10 Apr 2018 21:18 )  <0.015 ng/mL / x     / x     / x     / x          RADIOLOGY: XR CHEST AP 04/11/2018  IMPRESSION: Mild basilar congestion. No focal infiltrate.    ECG [my interpretation]:Sinus rhythm no acute ST T wave abnormalities.

## 2018-04-11 NOTE — CONSULT NOTE ADULT - ASSESSMENT
· Assessment		  Patient is a 89 yo female from home with PMH of COPD and PSH of cataract surgery presents to ED s/p one episode of unresponsiveness for couple minutes yesterday. In ER, vitals wnl except BP 74/53mmHgCT head No acute intracranial hemorrhage, mass effect, or evidence of acute vascular territorial infarction        Problem/Plan - 1:  ·  Problem: Syncope.  Plan: pt LOC for couple minutes after cortisone injection, likely due to vasovagal vs orthostatic hypotension.  BP stable-resume home BP meds.  Can discontinue telemetry.        Problem/Plan - 2:  ·  Problem: AURORA (acute kidney injury).  Plan: BUN / Cr 26/1.54, unknown baseline , likely 2/2 dehydration vs hypotension.  Follow creatinine          Problem/Plan - 3:  ·  Problem: COPD exacerbation.  Plan: former smoker, pt take albuterol inhaler at home as needed, pt had SOB/labored breathing as per family

## 2018-04-11 NOTE — H&P ADULT - PROBLEM SELECTOR PLAN 1
pt LOC for couple minutes after cortisone injection, likely due to vasovagal vs orthostatic hypotension  EKG NSR , low voltage QRS, no ST or T wave changes  CT head negative  BP 74/53mmHg on admission, given 1 liter NS bolus, BP up to 112/68  f/u orthostatic   f/u TTE, Carotid dopper  f/u PT eval  Neuro consulted Dr. Chaney

## 2018-04-11 NOTE — CONSULT NOTE ADULT - SUBJECTIVE AND OBJECTIVE BOX
Patient is a 89y Female whom presented to the hospital with     HPI:  Patient is a 89 yo female from home with PMH of COPD and PSH of cataract surgery presents to ED s/p one episode of unresponsiveness for couple minutes yesterday. Pt's family reports that pt was at the Retinologist office for macular degeneration and became unresponsive after cortisone injection to right eye. Pt was given eye drops containing beta blockers due to increasing eye pressure s/p injection. Pt denied any dizziness, room spinning sensation or falls. Pt's BP was measured at the Retinologist office and was noted to be low. Pt's family also reports pt exhibited shallow breathing and labored respiration. EMS was called and per pt nursing aid, pt was given oxygen that appears to have helped. Pt's nursing aid reports a near fall yesterday at home but was caught by the nursing aid. Pt denies any fever, chills, lethargy, headache, chest pain, palpitation, abd pain , diarrhea, nausea,  vomiting,  or any other complaints.    In ER, vitals wnl except BP 74/53mmHg, given 1 liter NS bolus, BP up to 112/68, s/p Duoneb x2 , solumedrol 125 mg IV in ER. Labs wnl except BUN / Cr 26/1.54, started NS IV fluid. EKG NSR , low voltage QRS, no ST or T wave changes. CT head No acute intracranial hemorrhage, mass effect, or evidence of acute vascular territorial infarction. CXR unremarkable, will f/u official report. (11 Apr 2018 03:02)    pts current chart reviewed and case discussed with resident  pt denies pmh of renal ds, proteinuria, renal stones, recurrent uti or nephrotic edema or nephrotic syndrome  pt give pmh of retinopathy and s/p laser treatment  pt denies Nsaids use or otc other nephrotoxic supplements  PAST MEDICAL & SURGICAL HISTORY:  COPD (chronic obstructive pulmonary disease)  History of cataract surgery      Home Medications: Reviewed    MEDICATIONS  (STANDING):  docusate sodium 100 milliGRAM(s) Oral three times a day  gabapentin 300 milliGRAM(s) Oral two times a day  heparin  Injectable 5000 Unit(s) SubCutaneous every 8 hours  latanoprost 0.005% Ophthalmic Solution 1 Drop(s) Both EYES at bedtime  methotrexate 15 milliGRAM(s) Oral <User Schedule>  predniSONE   Tablet 5 milliGRAM(s) Oral daily  senna 2 Tablet(s) Oral at bedtime  sertraline 150 milliGRAM(s) Oral daily  traMADol 25 milliGRAM(s) Oral two times a day    MEDICATIONS  (PRN):  ALBUTerol/ipratropium for Nebulization 3 milliLiter(s) Nebulizer every 6 hours PRN Shortness of Breath  aluminum hydroxide/magnesium hydroxide/simethicone Suspension 30 milliLiter(s) Oral every 6 hours PRN Dyspepsia      Allergies    No Known Allergies    Intolerances        SOCIAL HISTORY:  Alcohol use [X ] No  [ ] Yes  Smoking  [ X] No  [ ] Yes  Drug Abuse [X ] No  [ ] Yes  Tattoo [X ] No  [ ] Yes  History of Blood transfusion [ X] No  [ ] Yes    FAMILY HISTORY:  No pertinent family history in first degree relatives      Diabetes [ ]  Hypertension [ ]  Kidney Stones [ ]  Heart Disease [ ]  Hyperlipidemia [ ]  Cancer [ ]    REVIEW OF SYSTEMS:  CONSTITUTIONAL: No weakness, fevers or chills  EYES/ENT: No visual changes;  No vertigo or throat pain   NECK: No pain or stiffness  RESPIRATORY: No cough, wheezing, hemoptysis; No shortness of breath  CARDIOVASCULAR: No chest pain or palpitations  GASTROINTESTINAL: No abdominal or epigastric pain. No nausea, vomiting, or hematemesis; No diarrhea or constipation. No melena or hematochezia.  GENITOURINARY: No dysuria, frequency or hematuria  NEUROLOGICAL: No numbness or weakness  SKIN: No itching, burning, rashes, or lesions   All other review of systems is negative unless indicated above    Vital Signs  T(F): 98.7 (04-11-18 @ 12:29), Max: 98.7 (04-11-18 @ 12:29)  HR: 85 (04-11-18 @ 13:51) (62 - 97)  BP: 180/89 (04-11-18 @ 13:51) (74/53 - 198/111)  ABP: --  RR: 16 (04-11-18 @ 12:29) (15 - 16)  SpO2: 95% (04-11-18 @ 13:51) (94% - 97%)  Wt(kg): --  CVP(cm H2O): --  CO: --  PCWP: --    I and O's:    Daily Height in cm: 157.48 (10 Apr 2018 20:19)    Daily     PHYSICAL EXAM:  Constitutional: well developed, well nourished  and in nad  HEENT: PERRLA,  no icteric sclera and mild pallor of conjunctiva noted  Neck: No JVD, thyromegaly or adenopathy  Respiratory: reduced air entry lower lungs with no rales, wheezing or rhonchi  Cardiovascular: S1 and S2 normally heard  Gastrointestinal: soft, nondistended, nontender and normal bowel sounds heard  Extremities: No peripheral edema or cyanosis  Neurological: A/O x 3, no focal deficits  Psychiatric: Normal mood, normal affect  : No flank tenderness  Skin: No rashes        LABS:                        12.1   8.8   )-----------( 185      ( 10 Apr 2018 21:18 )             38.7           04-10    138  |  106  |  26<H>  ----------------------------<  96  5.0   |  25  |  1.54<H>    Ca    8.3<L>      10 Apr 2018 21:18    TPro  6.1  /  Alb  2.8<L>  /  TBili  0.3  /  DBili  x   /  AST  21  /  ALT  25  /  AlkPhos  52  04-10          URINE STUDIES:                    RADIOLOGY & ADDITIONAL STUDIES: Patient is a 89y Female whom presented to the hospital with syncope/hypotension, noted to have timo.    Medical HPI:  Patient is a 89 yo female from home with PMH of COPD and PSH of cataract surgery presents to ED s/p one episode of unresponsiveness for couple minutes yesterday. Pt's family reports that pt was at the Retinologist office for macular degeneration and became unresponsive after cortisone injection to right eye. Pt was given eye drops containing beta blockers due to increasing eye pressure s/p injection. Pt denied any dizziness, room spinning sensation or falls. Pt's BP was measured at the Retinologist office and was noted to be low. Pt's family also reports pt exhibited shallow breathing and labored respiration. EMS was called and per pt nursing aid, pt was given oxygen that appears to have helped. Pt's nursing aid reports a near fall yesterday at home but was caught by the nursing aid. Pt denies any fever, chills, lethargy, headache, chest pain, palpitation, abd pain , diarrhea, nausea,  vomiting,  or any other complaints.    In ER, vitals wnl except BP 74/53mmHg, given 1 liter NS bolus, BP up to 112/68, s/p Duoneb x2 , solumedrol 125 mg IV in ER. Labs wnl except BUN / Cr 26/1.54, started NS IV fluid. EKG NSR , low voltage QRS, no ST or T wave changes. CT head No acute intracranial hemorrhage, mass effect, or evidence of acute vascular territorial infarction. CXR unremarkable, will f/u official report. (11 Apr 2018 03:02)  Renal HPI :Hx obtained from pts daughter at bedside  pts current chart reviewed and case discussed with resident  pt/family  denies pmh of renal ds, proteinuria, renal stones, recurrent uti or nephrotic edema or nephrotic syndrome  pt denies Nsaids use or otc other nephrotoxic supplements recently  pt currently denies cp,sob ,skin rash ,leg edema or gu symptons  pt is voiding normally    PAST MEDICAL & SURGICAL HISTORY:  COPD (chronic obstructive pulmonary disease)  History of cataract surgery  Hx of Mixed connective disorder ?Fibromyalgia om Methotrexate      Home Medications: Reviewed    MEDICATIONS  (STANDING):  docusate sodium 100 milliGRAM(s) Oral three times a day  gabapentin 300 milliGRAM(s) Oral two times a day  heparin  Injectable 5000 Unit(s) SubCutaneous every 8 hours  latanoprost 0.005% Ophthalmic Solution 1 Drop(s) Both EYES at bedtime  methotrexate 15 milliGRAM(s) Oral <User Schedule>  predniSONE   Tablet 5 milliGRAM(s) Oral daily  senna 2 Tablet(s) Oral at bedtime  sertraline 150 milliGRAM(s) Oral daily  traMADol 25 milliGRAM(s) Oral two times a day    MEDICATIONS  (PRN):  ALBUTerol/ipratropium for Nebulization 3 milliLiter(s) Nebulizer every 6 hours PRN Shortness of Breath  aluminum hydroxide/magnesium hydroxide/simethicone Suspension 30 milliLiter(s) Oral every 6 hours PRN Dyspepsia      Allergies    No Known Allergies    Intolerances        SOCIAL HISTORY:  Alcohol use [X ] No  [ ] Yes  Smoking  [ X] No  [ ] Yes  Drug Abuse [X ] No  [ ] Yes  Tattoo [X ] No  [ ] Yes  History of Blood transfusion [ X] No  [ ] Yes    FAMILY HISTORY:  No pertinent family history in first degree relatives      REVIEW OF SYSTEMS:  CONSTITUTIONAL: No weakness, fevers or chills  EYES/ENT: No visual changes;  No vertigo or throat pain   NECK: No pain or stiffness  RESPIRATORY: No cough, wheezing, hemoptysis; No shortness of breath  CARDIOVASCULAR: No chest pain or palpitations  GASTROINTESTINAL: + abdominal dyscomfort-non specific. No nausea, vomiting, or hematemesis; No diarrhea or constipation. No melena or hematochezia.  GENITOURINARY: No dysuria, frequency or hematuria  NEUROLOGICAL: No numbness or weakness  SKIN: No itching, burning, rashes, or lesions   All other review of systems is negative unless indicated above    Vital Signs  T(F): 98.7 (04-11-18 @ 12:29), Max: 98.7 (04-11-18 @ 12:29)  HR: 85 (04-11-18 @ 13:51) (62 - 97)  BP: 180/89 (04-11-18 @ 13:51) (74/53 - 198/111)  ABP: --repeat BP 150s/80s  RR: 16 (04-11-18 @ 12:29) (15 - 16)  SpO2: 95% (04-11-18 @ 13:51) (94% - 97%)  Wt(kg): --  CVP(cm H2O): --  CO: --  PCWP: --    I and O's:    Daily Height in cm: 157.48 (10 Apr 2018 20:19)    Daily     PHYSICAL EXAM:  Constitutional: well developed, well nourished  and in nad  HEENT: PERRLA,  no icteric sclera and mild pallor of conjunctiva noted  Neck: No JVD, thyromegaly or adenopathy  Respiratory: reduced air entry lower lungs with no rales, wheezing or rhonchi  Cardiovascular: S1 and S2 normally heard  Gastrointestinal: soft, nondistended, nontender and normal bowel sounds heard  Extremities: No peripheral edema or cyanosis  Neurological: A/O x 3, no focal deficits  Psychiatric: Normal mood, normal affect  : No flank tenderness  Skin: No rashes        LABS:                        12.1   8.8   )-----------( 185      ( 10 Apr 2018 21:18 )             38.7           04-10    138  |  106  |  26<H>  ----------------------------<  96  5.0   |  25  |  1.54<H>    Ca    8.3<L>      10 Apr 2018 21:18    TPro  6.1  /  Alb  2.8<L>  /  TBili  0.3  /  DBili  x   /  AST  21  /  ALT  25  /  AlkPhos  52  04-10          URINE STUDIES:    pending                RADIOLOGY & ADDITIONAL STUDIES:  < from: Xray Chest 1 View AP/PA (04.11.18 @ 01:51) >  EXAM:  XR CHEST AP OR PA 1V                            PROCEDURE DATE:  04/11/2018          INTERPRETATION:  Chest one view    HISTORY: COPD    COMPARISON STUDY: 9/15/2012    Frontal expiratory view of the chest shows the heart to be normal in   size. The lungs show increased basilar markings and there is no evidence   of pneumothorax nor pleural effusion.    IMPRESSION:  Mild basilar congestion. No focal infiltrate.    < end of copied text >

## 2018-04-11 NOTE — CONSULT NOTE ADULT - ASSESSMENT
A/P:  1.AURORA: r/o secondary to pre -renal azotemia ,secondary to syncope/hypotension,less likely other cause  -suggest to get urine studies and f/u repeat bmp tonight  -order renal sono  -Keep patient euvolemic and renal diet  -Avoid Nephrotoxic Meds/ Agents such as (NSAIDs, IV contrast, Aminoglycosides such as gentamicin, -Gadolinium contrast, Phosphate containing enemas, etc..)  -Adjust Medications according to eGFR  -f/u bmp daily  2.HTN: bp is mildly high  -add low salt diet  -titrate bp mes to keep bp<140/80

## 2018-04-11 NOTE — H&P ADULT - PROBLEM SELECTOR PLAN 4
IMPROVE VTE Individual Risk Assessment    RISK                                                          Points  [] Previous VTE                                           3  [] Thrombophilia                                        2  [] Lower limb paralysis                              2   [] Current Cancer                                       2   [x] Immobilization > 24 hrs                        1  [] ICU/CCU stay > 24 hours                       1  [x] Age > 60                                                   1    IMPROVE VTE Score: 2    need for DVT ppx, on heparin  no need for GI ppx

## 2018-04-11 NOTE — H&P ADULT - ASSESSMENT
Patient is a 89 yo female from home with PMH of COPD and PSH of cataract surgery presents to ED s/p one episode of unresponsiveness for couple minutes yesterday. Pt denied any dizziness, room spinning sensation. Pt's BP was measured at the Retinologist office and was noted to be low. Pt's family also reports pt exhibited shallow breathing and labored respiration. Pt's nursing aid reports a near fall yesterday at home but was caught by the nursing aid. In ER, vitals wnl except BP 74/53mmHg, given 1 liter NS bolus, BP up to 112/68, s/p Duoneb x2 , solumedrol 125 mg IV in ER. Labs wnl except BUN / Cr 26/1.54, started NS IV fluid. EKG NSR , low voltage QRS, no ST or T wave changes. CT head No acute intracranial hemorrhage, mass effect, or evidence of acute vascular territorial infarction. Pt will be admitted to tele for syncope. Patient is a 89 yo female from home with PMH of COPD and PSH of cataract surgery presents to ED s/p one episode of unresponsiveness for couple minutes yesterday. Pt denied any dizziness, room spinning sensation or falls. Pt's BP was measured at the Retinologist office and was noted to be low. Pt's family also reports pt exhibited shallow breathing and labored respiration. Pt's nursing aid reports a near fall yesterday at home but was caught by the nursing aid. In ER, vitals wnl except BP 74/53mmHg, given 1 liter NS bolus, BP up to 112/68, s/p Duoneb x2 , solumedrol 125 mg IV in ER. Labs wnl except BUN / Cr 26/1.54, started NS IV fluid. EKG NSR , low voltage QRS, no ST or T wave changes. CT head No acute intracranial hemorrhage, mass effect, or evidence of acute vascular territorial infarction. Pt will be admitted to tele for syncope. Patient is a 89 yo female from home with PMH of COPD and PSH of cataract surgery presents to ED s/p one episode of unresponsiveness for couple minutes yesterday. Pt denied any dizziness, room spinning sensation or falls. Pt's BP was measured at the Retinologist office for macular degeneration and was noted to be low. Pt's family also reports pt exhibited shallow breathing and labored respiration. Pt's nursing aid reports a near fall yesterday at home but was caught by the nursing aid. In ER, vitals wnl except BP 74/53mmHg, given 1 liter NS bolus, BP up to 112/68, s/p Duoneb x2 , solumedrol 125 mg IV in ER. Labs wnl except BUN / Cr 26/1.54, started NS IV fluid. EKG NSR , low voltage QRS, no ST or T wave changes. CT head No acute intracranial hemorrhage, mass effect, or evidence of acute vascular territorial infarction. Pt will be admitted to [facility] for syncope.

## 2018-04-11 NOTE — CONSULT NOTE ADULT - SUBJECTIVE AND OBJECTIVE BOX
Patient is a 89y old  Female who presents with a chief complaint of syncope (11 Apr 2018 03:02)      HPI:  Patient is a 89 yo female from home with PMH of COPD and PSH of cataract surgery presents to ED s/p one episode of unresponsiveness for couple minutes yesterday. Pt's family reports that pt was at the Retinologist office for macular degeneration and became unresponsive after cortisone injection to right eye. Pt was given eye drops containing beta blockers due to increasing eye pressure s/p injection. Pt denied any dizziness, room spinning sensation or falls. Pt's BP was measured at the Retinologist office and was noted to be low. Pt's family also reports pt exhibited shallow breathing and labored respiration. EMS was called and per pt nursing aid, pt was given oxygen that appears to have helped. Pt's nursing aid reports a near fall yesterday at home but was caught by the nursing aid. Pt denies any fever, chills, lethargy, headache, chest pain, palpitation, abd pain , diarrhea, nausea,  vomiting,  or any other complaints.    In ER, vitals wnl except BP 74/53mmHg, given 1 liter NS bolus, BP up to 112/68, s/p Duoneb x2 , solumedrol 125 mg IV in ER. Labs wnl except BUN / Cr 26/1.54, started NS IV fluid. EKG NSR , low voltage QRS, no ST or T wave changes. CT head No acute intracranial hemorrhage, mass effect, or evidence of acute vascular territorial infarction. CXR unremarkable, will f/u official report. (11 Apr 2018 03:02)         Neurological Review of Systems:  No difficulty with language.  No vision loss or double vision.  No dizziness, vertigo or new hearing loss.  No difficulty with speech or swallowing.  No focal weakness.  No focal sensory changes.  No numbness or tingling in the bilateral lower extremities.  No difficulty with balance.  No difficulty with ambulation.        MEDICATIONS  (STANDING):  docusate sodium 100 milliGRAM(s) Oral three times a day  gabapentin 300 milliGRAM(s) Oral two times a day  heparin  Injectable 5000 Unit(s) SubCutaneous every 8 hours  predniSONE   Tablet 5 milliGRAM(s) Oral daily  senna 2 Tablet(s) Oral at bedtime  sertraline 150 milliGRAM(s) Oral daily    MEDICATIONS  (PRN):  ALBUTerol/ipratropium for Nebulization 3 milliLiter(s) Nebulizer every 6 hours PRN Shortness of Breath  aluminum hydroxide/magnesium hydroxide/simethicone Suspension 30 milliLiter(s) Oral every 6 hours PRN Dyspepsia    Allergies    No Known Allergies    Intolerances      PAST MEDICAL & SURGICAL HISTORY:  COPD (chronic obstructive pulmonary disease)  History of cataract surgery    FAMILY HISTORY:  No pertinent family history in first degree relatives    SOCIAL HISTORY: non smoker/ former smoker/ active smoker    Review of Systems:  Constitutional: No generalized weakness. No fevers or chills.                    Eyes, Ears, Mouth, Throat: No vision loss   Respiratory: No shortness of breath or cough.                                Cardiovascular: No chest pain or palpitations  Gastrointestinal: No nausea or vomiting.                                         Genitourinary: No urinary incontinence or burning on urination.  Musculoskeletal: No joint pain.                                                           Dermatologic: No rash.  Neurological: as per HPI                                                                      Psychiatric: No behavioral problems.  Endocrine: No known hypoglycemia.               Hematologic/Lymphatic: No easy bleeding.    O:  Vital Signs Last 24 Hrs  T(C): 36.3 (11 Apr 2018 07:46), Max: 36.9 (10 Apr 2018 23:31)  T(F): 97.3 (11 Apr 2018 07:46), Max: 98.4 (10 Apr 2018 23:31)  HR: 82 (11 Apr 2018 07:46) (62 - 82)  BP: 164/88 (11 Apr 2018 08:08) (74/53 - 177/89)  BP(mean): --  RR: 16 (11 Apr 2018 07:46) (15 - 16)  SpO2: 97% (11 Apr 2018 07:46) (94% - 97%)    General Exam:   General appearance: No acute distress                 Cardiovascular: Pedal dorsalis pulses intact bilaterally    Mental Status: Orientated to self, date and place.  Attention intact.  No dysarthria, aphasia or neglect.  Knowledge intact.  Registration intact.  Short and long term memory grossly intact.      Cranial Nerves: CN I - not tested.  PERRL, EOMI, VFF, no nystagmus or diplopia.  No APD.  Fundi not visualized.  CN V1-3 intact to light touch and pinprick.  No facial asymmetry.  Hearing intact to finger rub bilaterally.  Tongue, uvula and palate midline.  Sternocleidomastoid and Trapezius intact bilaterally.    Motor:   Tone: normal.                  Strength intact throughout  No pronator drift bilaterally                      No dysmetria on finger-nose-finger or heel-shin-heel  No truncal ataxia.  No resting, postural or action tremor.  No myoclonus.    Sensation: intact to light touch, pinprick, vibration and proprioception    Deep Tendon Reflexes: 1+ bilateral biceps, triceps, brachioradialis, knee and ankle  Toes flexor bilaterally    Gait: normal and stable.  Rhomberg -jonathon.    Other:     LABS:                        12.1   8.8   )-----------( 185      ( 10 Apr 2018 21:18 )             38.7     04-10    138  |  106  |  26<H>  ----------------------------<  96  5.0   |  25  |  1.54<H>    Ca    8.3<L>      10 Apr 2018 21:18    TPro  6.1  /  Alb  2.8<L>  /  TBili  0.3  /  DBili  x   /  AST  21  /  ALT  25  /  AlkPhos  52  04-10            RADIOLOGY & ADDITIONAL STUDIES:    EKG:  < from: CT Head No Cont (04.10.18 @ 21:53) >  IMPRESSION:     No acute intracranial hemorrhage, mass effect, or evidence of acute   vascular territorial infarction.    Chronic microvascular disease.    If clinical symptoms persist or worsen, more sensitive evaluation with   brain MRI may be obtained, if no contraindications exist.    < end of copied text > Patient is a 89y old  Female who presents with a chief complaint of syncope (11 Apr 2018 03:02)      HPI:  Patient is a 89 yo female from home with PMH of COPD and PSH of cataract surgery presents to ED s/p one episode of unresponsiveness for couple minutes yesterday. Pt's family reports that pt was at the Retinologist office for macular degeneration and became unresponsive after cortisone injection to right eye. Pt was given eye drops containing beta blockers due to increasing eye pressure s/p injection. The patient was feeling lightheaded and slowly drifted and lost consciousness at the ophthalmologist.  The patient was pale.  There was no shaking, tongue bite, foaming at the mouth, bowel or bladder incontinence  Duration of LOC unknown.  The patient has not been eating/ drinking well over the past few weeks per the son and daugher.  Her mental status improved after receiving O2 and IVF.    She has occational word finding diffculty, chronic (months) and drags her right leg for the past 3 months, has back pain and spinal stenosis.)    Neurological Review of Systems:  No difficulty with language.  No vision loss or double vision.  No dizziness, vertigo or new hearing loss.  No difficulty with speech or swallowing.  No focal sensory changes.  No numbness or tingling in the bilateral lower extremities.  No difficulty with balance.  +chronic difficulty with ambulation.        MEDICATIONS  (STANDING):  docusate sodium 100 milliGRAM(s) Oral three times a day  gabapentin 300 milliGRAM(s) Oral two times a day  heparin  Injectable 5000 Unit(s) SubCutaneous every 8 hours  predniSONE   Tablet 5 milliGRAM(s) Oral daily  senna 2 Tablet(s) Oral at bedtime  sertraline 150 milliGRAM(s) Oral daily    MEDICATIONS  (PRN):  ALBUTerol/ipratropium for Nebulization 3 milliLiter(s) Nebulizer every 6 hours PRN Shortness of Breath  aluminum hydroxide/magnesium hydroxide/simethicone Suspension 30 milliLiter(s) Oral every 6 hours PRN Dyspepsia    Allergies    No Known Allergies    Intolerances      PAST MEDICAL & SURGICAL HISTORY:  COPD (chronic obstructive pulmonary disease)  History of cataract surgery    FAMILY HISTORY:  No pertinent family history in first degree relatives    SOCIAL HISTORY: non smoker    Review of Systems:  Constitutional: + generalized weakness.                  Eyes, Ears, Mouth, Throat: No vision loss   Respiratory: + shortness of breath.                                Cardiovascular: No chest pain or palpitations  Gastrointestinal: No nausea or vomiting.                                         Genitourinary: No urinary incontinence.  Musculoskeletal: No joint pain.                                                           Dermatologic: No rash.  Neurological: as per HPI                                                                      Psychiatric: No behavioral problems.  Endocrine: No known hypoglycemia.               Hematologic/Lymphatic: No easy bleeding.    O:  Vital Signs Last 24 Hrs  T(C): 36.3 (11 Apr 2018 07:46), Max: 36.9 (10 Apr 2018 23:31)  T(F): 97.3 (11 Apr 2018 07:46), Max: 98.4 (10 Apr 2018 23:31)  HR: 82 (11 Apr 2018 07:46) (62 - 82)  BP: 164/88 (11 Apr 2018 08:08) (74/53 - 177/89)  BP(mean): --  RR: 16 (11 Apr 2018 07:46) (15 - 16)  SpO2: 97% (11 Apr 2018 07:46) (94% - 97%)    General Exam:   General appearance: No acute distress                 Cardiovascular: Pedal dorsalis pulses intact bilaterally    Mental Status: Orientated to self and place but not to date.  Attention intact.  No dysarthria, aphasia or neglect.  Knowledge intact.  Registration intact.  Short and long term memory 2/3.      Cranial Nerves: CN I - not tested.  PERRL, EOMI, VFF, no nystagmus or diplopia.  No APD.  Fundi not visualized.  CN V1-3 intact to light touch.  No facial asymmetry.  Hearing intact to finger rub bilaterally.  Tongue, uvula and palate midline.  Sternocleidomastoid and Trapezius intact bilaterally.    Motor:   Tone: normal.                  Strength intact throughout  No pronator drift bilaterally                      No dysmetria on finger-nose-finger or heel-shin-heel    Sensation: intact to light touch    Deep Tendon Reflexes: 2+ bilateral biceps, triceps, brachioradialis, knee  Toes flexor bilaterally    Gait: wobbly and cautious, uses walker    Other:     LABS:                        12.1   8.8   )-----------( 185      ( 10 Apr 2018 21:18 )             38.7     04-10    138  |  106  |  26<H>  ----------------------------<  96  5.0   |  25  |  1.54<H>    Ca    8.3<L>      10 Apr 2018 21:18    TPro  6.1  /  Alb  2.8<L>  /  TBili  0.3  /  DBili  x   /  AST  21  /  ALT  25  /  AlkPhos  52  04-10            RADIOLOGY & ADDITIONAL STUDIES:    EKG: NSR  < from: CT Head No Cont (04.10.18 @ 21:53) > (iamges reviwed)  IMPRESSION:     No acute intracranial hemorrhage, mass effect, or evidence of acute   vascular territorial infarction.    Chronic microvascular disease.    If clinical symptoms persist or worsen, more sensitive evaluation with   brain MRI may be obtained, if no contraindications exist.    < end of copied text >

## 2018-04-11 NOTE — H&P ADULT - NSHPLABSRESULTS_GEN_ALL_CORE
Comprehensive Metabolic Panel (04.10.18 @ 21:18)    Sodium, Serum: 138 mmol/L    Potassium, Serum: 5.0 mmol/L    Chloride, Serum: 106 mmol/L    Carbon Dioxide, Serum: 25 mmol/L    Anion Gap, Serum: 7 mmol/L    Blood Urea Nitrogen, Serum: 26 mg/dL    Creatinine, Serum: 1.54 mg/dL    Glucose, Serum: 96 mg/dL    Calcium, Total Serum: 8.3 mg/dL    Protein Total, Serum: 6.1 g/dL    Albumin, Serum: 2.8 g/dL    Bilirubin Total, Serum: 0.3 mg/dL    Alkaline Phosphatase, Serum: 52 U/L    Aspartate Aminotransferase (AST/SGOT): 21 U/L    Alanine Aminotransferase (ALT/SGPT): 25 U/L DA    eGFR if Non : 29: Interpretative comment  The units for eGFR are ml/min/1.73m2 (normalized body surface area). The  eGFR is calculated from a serum creatinine using the CKD-EPI equation.  Other variables required for calculation are race, age and sex. Among  patients with chronic kidney disease (CKD), the eGFR is useful in  determining the stage of disease according to KDOQI CKD classification.  All eGFR results are reported numerically with the following  interpretation.          GFR                    With                 Without     (ml/min/1.73 m2)    Kidney Damage       Kidney Damage        >= 90                    Stage 1                     Normal        60-89                    Stage 2                     Decreased GFR        30-59     Stage 3                     Stage 3        15-29                    Stage 4                     Stage 4        < 15                      Stage 5                     Stage 5  Each stage of CKD assumes that the associated GFR level has been in  effect for at least 3 months. Determination of stages one and two (with  eGFR > 59 ml/min/m2) requires estimation of kidney damage for at least 3  months as defined by structural or functional abnormalities.  Limitations: All estimates of GFR will be less accurate for patients at  extremes of muscle mass (including but not limited to frail elderly,  critically ill, or cancer patients), those with unusual diets, and those  with conditions associated with reduced secretion or extrarenal  elimination of creatinine. The eGFR equation is not recommended for use  in patients with unstable creatinine levels. mL/min/1.73M2    eGFR if African American: 34 mL/min/1.73M2    Complete Blood Count + Automated Diff (04.10.18 @ 21:18)    WBC Count: 8.8 K/uL    RBC Count: 3.98 M/uL    Hemoglobin: 12.1 g/dL    Hematocrit: 38.7 %    Mean Cell Volume: 97.4 fl    Mean Cell Hemoglobin: 30.4 pg    Mean Cell Hemoglobin Conc: 31.2 gm/dL    Red Cell Distrib Width: 15.5 %    Platelet Count - Automated: 185 K/uL    Auto Neutrophil #: 6.5 K/uL    Auto Lymphocyte #: 1.6 K/uL    Auto Monocyte #: 0.6 K/uL    Auto Eosinophil #: 0.1 K/uL    Auto Basophil #: 0.1 K/uL    Auto Neutrophil %: 73.3: Differential percentages must be correlated with absolute numbers for  clinical significance. %    Auto Lymphocyte %: 17.6 %    Auto Monocyte %: 6.4 %    Auto Eosinophil %: 1.5 %    Auto Basophil %: 1.1 %      < from: CT Head No Cont (04.10.18 @ 21:53) >    FINDINGS:    There is no acute intracranial hemorrhage, mass effect, midline shift,   extra-axial collection, hydrocephalus, or evidence of acute vascular   territorial infarction. Mild patchy hypodensities within the   periventricular and subcortical white matter, although nonspecific,   likely reflect chronic microvascular disease. Cerebral volume loss   results in prominence of the ventricles and sulci.    The visualized paranasal sinuses and mastoid air cells are clear. Status   post bilateral orbital lens surgery. Bilateral scleral calcifications.   Visualized osseous structures are intact.    IMPRESSION:     No acute intracranial hemorrhage, mass effect, or evidence of acute   vascular territorial infarction.    Chronic microvascular disease.    If clinical symptoms persist or worsen, more sensitive evaluation with   brain MRI may be obtained, if no contraindications exist.    < end of copied text >

## 2018-04-12 ENCOUNTER — TRANSCRIPTION ENCOUNTER (OUTPATIENT)
Age: 83
End: 2018-04-12

## 2018-04-12 LAB
ANION GAP SERPL CALC-SCNC: 9 MMOL/L — SIGNIFICANT CHANGE UP (ref 5–17)
BUN SERPL-MCNC: 19 MG/DL — HIGH (ref 7–18)
CALCIUM SERPL-MCNC: 8.7 MG/DL — SIGNIFICANT CHANGE UP (ref 8.4–10.5)
CHLORIDE SERPL-SCNC: 109 MMOL/L — HIGH (ref 96–108)
CO2 SERPL-SCNC: 26 MMOL/L — SIGNIFICANT CHANGE UP (ref 22–31)
CREAT SERPL-MCNC: 1.06 MG/DL — SIGNIFICANT CHANGE UP (ref 0.5–1.3)
GLUCOSE SERPL-MCNC: 90 MG/DL — SIGNIFICANT CHANGE UP (ref 70–99)
POTASSIUM SERPL-MCNC: 3.4 MMOL/L — LOW (ref 3.5–5.3)
POTASSIUM SERPL-SCNC: 3.4 MMOL/L — LOW (ref 3.5–5.3)
SODIUM SERPL-SCNC: 144 MMOL/L — SIGNIFICANT CHANGE UP (ref 135–145)

## 2018-04-12 PROCEDURE — 95819 EEG AWAKE AND ASLEEP: CPT | Mod: 26

## 2018-04-12 RX ORDER — ACETAMINOPHEN 500 MG
650 TABLET ORAL EVERY 6 HOURS
Qty: 0 | Refills: 0 | Status: DISCONTINUED | OUTPATIENT
Start: 2018-04-12 | End: 2018-04-13

## 2018-04-12 RX ORDER — HYDRALAZINE HCL 50 MG
1 TABLET ORAL
Qty: 0 | Refills: 0 | COMMUNITY
Start: 2018-04-12

## 2018-04-12 RX ORDER — POTASSIUM CHLORIDE 20 MEQ
20 PACKET (EA) ORAL ONCE
Qty: 0 | Refills: 0 | Status: COMPLETED | OUTPATIENT
Start: 2018-04-12 | End: 2018-04-12

## 2018-04-12 RX ORDER — TRAMADOL HYDROCHLORIDE 50 MG/1
0.5 TABLET ORAL
Qty: 0 | Refills: 0 | COMMUNITY
Start: 2018-04-12

## 2018-04-12 RX ORDER — SENNA PLUS 8.6 MG/1
2 TABLET ORAL
Qty: 0 | Refills: 0 | COMMUNITY
Start: 2018-04-12

## 2018-04-12 RX ORDER — DOCUSATE SODIUM 100 MG
1 CAPSULE ORAL
Qty: 0 | Refills: 0 | COMMUNITY
Start: 2018-04-12

## 2018-04-12 RX ORDER — HYDRALAZINE HCL 50 MG
25 TABLET ORAL EVERY 8 HOURS
Qty: 0 | Refills: 0 | Status: DISCONTINUED | OUTPATIENT
Start: 2018-04-12 | End: 2018-04-13

## 2018-04-12 RX ADMIN — Medication 25 MILLIGRAM(S): at 21:18

## 2018-04-12 RX ADMIN — Medication 5 MILLIGRAM(S): at 05:18

## 2018-04-12 RX ADMIN — METHOTREXATE 15 MILLIGRAM(S): 2.5 TABLET ORAL at 17:57

## 2018-04-12 RX ADMIN — LATANOPROST 1 DROP(S): 0.05 SOLUTION/ DROPS OPHTHALMIC; TOPICAL at 21:18

## 2018-04-12 RX ADMIN — HEPARIN SODIUM 5000 UNIT(S): 5000 INJECTION INTRAVENOUS; SUBCUTANEOUS at 05:18

## 2018-04-12 RX ADMIN — GABAPENTIN 300 MILLIGRAM(S): 400 CAPSULE ORAL at 05:18

## 2018-04-12 RX ADMIN — SENNA PLUS 2 TABLET(S): 8.6 TABLET ORAL at 21:18

## 2018-04-12 RX ADMIN — HEPARIN SODIUM 5000 UNIT(S): 5000 INJECTION INTRAVENOUS; SUBCUTANEOUS at 21:18

## 2018-04-12 RX ADMIN — TRAMADOL HYDROCHLORIDE 25 MILLIGRAM(S): 50 TABLET ORAL at 17:57

## 2018-04-12 RX ADMIN — HEPARIN SODIUM 5000 UNIT(S): 5000 INJECTION INTRAVENOUS; SUBCUTANEOUS at 13:49

## 2018-04-12 RX ADMIN — Medication 650 MILLIGRAM(S): at 20:50

## 2018-04-12 RX ADMIN — Medication 20 MILLIEQUIVALENT(S): at 13:48

## 2018-04-12 RX ADMIN — TRAMADOL HYDROCHLORIDE 25 MILLIGRAM(S): 50 TABLET ORAL at 18:47

## 2018-04-12 RX ADMIN — SERTRALINE 150 MILLIGRAM(S): 25 TABLET, FILM COATED ORAL at 13:48

## 2018-04-12 RX ADMIN — Medication 100 MILLIGRAM(S): at 21:18

## 2018-04-12 RX ADMIN — Medication 100 MILLIGRAM(S): at 13:48

## 2018-04-12 RX ADMIN — Medication 100 MILLIGRAM(S): at 05:18

## 2018-04-12 RX ADMIN — Medication 25 MILLIGRAM(S): at 13:48

## 2018-04-12 RX ADMIN — GABAPENTIN 300 MILLIGRAM(S): 400 CAPSULE ORAL at 17:57

## 2018-04-12 NOTE — DISCHARGE NOTE ADULT - HOSPITAL COURSE
Patient is a 89 yo female from home with PMH of COPD and PSH of cataract surgery presents to ED s/p one episode of unresponsiveness for couple minutes yesterday. Pt denied any dizziness, room spinning sensation or falls. Pt's BP was measured at the Retinologist office for macular degeneration and was noted to be low. Pt's family also reports pt exhibited shallow breathing and labored respiration. Pt's nursing aid reports a near fall yesterday at home but was caught by the nursing aid. In ER, vitals wnl except BP 74/53mmHg, given 1 liter NS bolus, BP up to 112/68, s/p Duoneb x2 , solumedrol 125 mg IV in ER. Labs wnl except BUN / Cr 26/1.54, started NS IV fluid. EKG NSR , low voltage QRS, no ST or T wave changes. CT head No acute intracranial hemorrhage, mass effect, or evidence of acute vascular territorial infarction. Pt will be admitted to tele for syncope. pt LOC for couple minutes after cortisone injection, likely due to vasovagal vs orthostatic hypotension  EKG NSR , low voltage QRS, no ST or T wave changes. CT head negative. Cardiology recommended to dc tele. Per neurology, LOC concerning for syncope, unlikely to be neurological.  Will recommend MRI brain and EEG to r/o stroke/ seizures as patient has chronic word finding difficulty and dragging her right leg. For [facility]: r/o secondary to pre -renal azotemia ,secondary to syncope/ hypotension, less likely other cause. Con't with bronchodilators, steroids, o2 supplement as needed. Patient may need home o2, document o2 sat w/ and w/out o2, at rest and activity.  PT : DANIELLE

## 2018-04-12 NOTE — DISCHARGE NOTE ADULT - MEDICATION SUMMARY - MEDICATIONS TO TAKE
I will START or STAY ON the medications listed below when I get home from the hospital:    2L Oxygen via NC   -- 1 application inhaled once a day   -- Indication: For COPD exacerbation    predniSONE 5 mg oral tablet  -- 1 tab(s) by mouth once a day  -- Indication: For COPD exacerbation    traMADol 50 mg oral tablet  -- 0.5 tab(s) by mouth 2 times a day  -- Indication: For pain     gabapentin 300 mg oral capsule  -- 1 cap(s) by mouth 2 times a day  -- Indication: For Neuropathy     Zoloft  -- 150 milligram(s) by mouth once a day  -- Indication: For depression     methotrexate 15 mg oral tablet  -- 1 tab(s) by mouth once a week on THursday  -- Indication: For ppx    Anoro Ellipta 62.5 mcg-25 mcg/inh inhalation powder  -- 1 puff(s) inhaled once a day  -- Indication: For COPD exacerbation    docusate sodium 100 mg oral capsule  -- 1 cap(s) by mouth 3 times a day  -- Indication: For COnstipation     senna oral tablet  -- 2 tab(s) by mouth once a day (at bedtime)  -- Indication: For COnstipation     Lumigan 0.01% ophthalmic solution  -- 1 drop(s) to each affected eye once a day (in the evening)  -- Indication: For Cataract     hydrALAZINE 25 mg oral tablet  -- 1 tab(s) by mouth every 8 hours  -- Indication: For HTN

## 2018-04-12 NOTE — DISCHARGE NOTE ADULT - CARE PLAN
Principal Discharge DX:	COPD (chronic obstructive pulmonary disease)  Goal:	oxygen saturation >90%  Assessment and plan of treatment:	please continue home medications. Continue BP meds.

## 2018-04-12 NOTE — PROGRESS NOTE ADULT - SUBJECTIVE AND OBJECTIVE BOX
INTERVAL HPI/OVERNIGHT EVENTS: no major overnight events     VITAL SIGNS:  T(F): 98.1 (04-12-18 @ 05:03)  HR: 101 (04-12-18 @ 08:44)  BP: 140/90 (04-12-18 @ 08:44)  RR: 18 (04-12-18 @ 05:03)  SpO2: 87% (04-12-18 @ 10:42)  Wt(kg): --    PHYSICAL EXAM:    Constitutional: NAD, patient comfortably eating breakfast.   Eyes: PERRL, sclera clear   ENMT: no externa lesions   Neck: Supple, No JVD   Respiratory: CTAB   Cardiovascular: S1, S2 present, no RMG   Gastrointestinal: soft, non-tedner, non distended   Extremities: No cyanosis, edema or clubbing   Vascular: Pulses intact   Neurological: AO x 3       MEDICATIONS  (STANDING):  docusate sodium 100 milliGRAM(s) Oral three times a day  gabapentin 300 milliGRAM(s) Oral two times a day  heparin  Injectable 5000 Unit(s) SubCutaneous every 8 hours  hydrALAZINE 25 milliGRAM(s) Oral every 8 hours  latanoprost 0.005% Ophthalmic Solution 1 Drop(s) Both EYES at bedtime  methotrexate 15 milliGRAM(s) Oral <User Schedule>  potassium chloride    Tablet ER 20 milliEquivalent(s) Oral once  predniSONE   Tablet 5 milliGRAM(s) Oral daily  senna 2 Tablet(s) Oral at bedtime  sertraline 150 milliGRAM(s) Oral daily  traMADol 25 milliGRAM(s) Oral two times a day    MEDICATIONS  (PRN):  ALBUTerol/ipratropium for Nebulization 3 milliLiter(s) Nebulizer every 6 hours PRN Shortness of Breath  aluminum hydroxide/magnesium hydroxide/simethicone Suspension 30 milliLiter(s) Oral every 6 hours PRN Dyspepsia      Allergies    No Known Allergies    Intolerances        LABS:                        12.1   8.8   )-----------( 185      ( 10 Apr 2018 21:18 )             38.7     04-12    144  |  109<H>  |  19<H>  ----------------------------<  90  3.4<L>   |  26  |  1.06    Ca    8.7      12 Apr 2018 07:18  Phos  3.0     04-11  Mg     2.7     04-11    TPro  6.1  /  Alb  2.8<L>  /  TBili  0.3  /  DBili  x   /  AST  21  /  ALT  25  /  AlkPhos  52  04-10          RADIOLOGY & ADDITIONAL TESTS: no new imaging tests

## 2018-04-12 NOTE — EEG REPORT - NS EEG TEXT BOX
CECI MICHAUD [id number]     Study Date: 		04-12-18    ROUTINE EEG    Technical Information:			  		  Placement and Labeling of Electrodes:  The EEG was performed utilizing 20 channels referential EEG connections (coronal over temporal over parasagittal montage) using all standard 10-20 electrode placements with EKG.  Recording was at a sampling rate of 256 samples per second per channel.  Time synchronized digital video recording was done simultaneously with EEG recording.  A low light infrared camera was used for low light recording.  Delta and seizure detection algorithms were utilized.    --------------------------------------------------------------------------------------------------  History:  CC/ HPI Patient is a 89y old  Female who presents with a chief complaint of syncope (12 Apr 2018 08:30)    MEDICATIONS  (STANDING):  docusate sodium 100 milliGRAM(s) Oral three times a day  gabapentin 300 milliGRAM(s) Oral two times a day  heparin  Injectable 5000 Unit(s) SubCutaneous every 8 hours  hydrALAZINE 25 milliGRAM(s) Oral every 8 hours  latanoprost 0.005% Ophthalmic Solution 1 Drop(s) Both EYES at bedtime  methotrexate 15 milliGRAM(s) Oral <User Schedule>  potassium chloride    Tablet ER 20 milliEquivalent(s) Oral once  predniSONE   Tablet 5 milliGRAM(s) Oral daily  senna 2 Tablet(s) Oral at bedtime  sertraline 150 milliGRAM(s) Oral daily  traMADol 25 milliGRAM(s) Oral two times a day    --------------------------------------------------------------------------------------------------  Study Interpretation:    FINDINGS:  The background was continuous, spontaneously variable and reactive.  There was no discernible PDR. The background consisted of polymorphic theta and delta frequencies.     Sleep Background:    Sleep was characterized by the presence of asymmetric vertex waves, symmetric spindles, and K-complexes.      Epileptiform Activity:   No epileptiform discharges were present.    Events:  No clinical events were recorded.  No seizures were recorded.    Activation Procedures:   -Hyperventilation was performed and did not elicit any abnormalities.     Photic driving response was noted intermittently.    Artifacts:  Intermittent myogenic and movement artifacts were noted.    ECG:  The heart rate on single channel ECG was predominantly between 60-70BPM.  -----------------------------------------------------------------------------------------------------    EEG Classification / Summary:  Normal EEG Study in Sleep    -----------------------------------------------------------------------------------------------------    Clinical Impression:  There were no epileptiform abnormalities recorded.        Yohana Roland, DO  Neurophysiology Fellow CECI MICHAUD [id number]     Study Date: 		04-12-18    ROUTINE EEG    Technical Information:			  		  Placement and Labeling of Electrodes:  The EEG was performed utilizing 20 channels referential EEG connections (coronal over temporal over parasagittal montage) using all standard 10-20 electrode placements with EKG.  Recording was at a sampling rate of 256 samples per second per channel.  Time synchronized digital video recording was done simultaneously with EEG recording.  A low light infrared camera was used for low light recording.  Delta and seizure detection algorithms were utilized.    --------------------------------------------------------------------------------------------------  History:  CC/ HPI Patient is a 89y old  Female who presents with a chief complaint of syncope (12 Apr 2018 08:30)    MEDICATIONS  (STANDING):  docusate sodium 100 milliGRAM(s) Oral three times a day  gabapentin 300 milliGRAM(s) Oral two times a day  heparin  Injectable 5000 Unit(s) SubCutaneous every 8 hours  hydrALAZINE 25 milliGRAM(s) Oral every 8 hours  latanoprost 0.005% Ophthalmic Solution 1 Drop(s) Both EYES at bedtime  methotrexate 15 milliGRAM(s) Oral <User Schedule>  potassium chloride    Tablet ER 20 milliEquivalent(s) Oral once  predniSONE   Tablet 5 milliGRAM(s) Oral daily  senna 2 Tablet(s) Oral at bedtime  sertraline 150 milliGRAM(s) Oral daily  traMADol 25 milliGRAM(s) Oral two times a day    --------------------------------------------------------------------------------------------------  Study Interpretation:    FINDINGS:  The background was continuous, spontaneously variable and reactive.  There was no discernible PDR. The background consisted of polymorphic theta and delta frequencies.     Sleep Background:    Sleep was characterized by the presence of asymmetric vertex waves, symmetric spindles, and K-complexes.      Epileptiform Activity:   No epileptiform discharges were present.    Events:  No clinical events were recorded.  No seizures were recorded.    Activation Procedures:   -Hyperventilation was performed and did not elicit any abnormalities.    -Photic driving response was noted intermittently.    Artifacts:  Intermittent myogenic and movement artifacts were noted.    ECG:  The heart rate on single channel ECG was predominantly between 60-70BPM.  -----------------------------------------------------------------------------------------------------    EEG Classification / Summary:  Normal EEG Study in drowsy and sleep states.    -----------------------------------------------------------------------------------------------------    Clinical Impression:  There were no epileptiform abnormalities recorded.        Yohana Roland DO  Neurophysiology Fellow    Uriel Tovar MD  Attending Physician, [facility]

## 2018-04-12 NOTE — PROGRESS NOTE ADULT - ASSESSMENT
A/P:  1.AURORA: r/o secondary to pre -renal azotemia ,secondary to syncope/hypotension,less likely other cause  -suggest to get urine studies and f/u repeat bmp tonight  -order renal sono  -Keep patient euvolemic and renal diet  -Avoid Nephrotoxic Meds/ Agents such as (NSAIDs, IV contrast, Aminoglycosides such as gentamicin, -Gadolinium contrast, Phosphate containing enemas, etc..)  -Adjust Medications according to eGFR  -f/u bmp daily  2.HTN: bp is mildly high  -add low salt diet  -titrate bp mes to keep bp<140/80 A/P:  1.AURORA: r/o secondary to pre -renal azotemia ,secondary to syncope/hypotension,less likely other cause, now renal function is improving  -Keep patient euvolemic and renal diet  -Avoid Nephrotoxic Meds/ Agents such as (NSAIDs, IV contrast, Aminoglycosides such as gentamicin, -Gadolinium contrast, Phosphate containing enemas, etc..)  -Adjust Medications according to eGFR  -f/u bmp daily  2.HTN: bp is acceptble   -add low salt diet  -titrate bp mes to keep bp<140/80  3.R/O CKD: stage 3 , secondary to vascular ds/htn  -pt needs outpatient renal f/u  -Keep patient euvolemic and renal diet  -Avoid Nephrotoxic Meds/ Agents such as (NSAIDs, IV contrast, Aminoglycosides such as gentamicin, -Gadolinium contrast, Phosphate containing enemas, etc..)  -Adjust Medications according to eGFR

## 2018-04-12 NOTE — PROGRESS NOTE ADULT - PROBLEM SELECTOR PLAN 1
- likely vasovagal response from intraocular injection   - patient is hypoxic to 86% on room air and would benefit from home oxygen  - continue BP medications   - continue pain medications    - since patient has 24 hr aide and wants to go home will DC home.

## 2018-04-12 NOTE — PROGRESS NOTE ADULT - ASSESSMENT
Patient is a 89 yo female from home with PMH of COPD and PSH of cataract surgery presents to ED s/p one episode of unresponsiveness for couple minutes yesterday. Pt denied any dizziness, room spinning sensation or falls. Pt's BP was measured at the Retinologist office for macular degeneration and was noted to be low. Pt's family also reports pt exhibited shallow breathing and labored respiration. Pt's nursing aid reports a near fall yesterday at home but was caught by the nursing aid. In ER, vitals wnl except BP 74/53mmHg, given 1 liter NS bolus, BP up to 112/68, s/p Duoneb x2 , solumedrol 125 mg IV in ER. Labs wnl except BUN / Cr 26/1.54, started NS IV fluid. EKG NSR , low voltage QRS, no ST or T wave changes. CT head No acute intracranial hemorrhage, mass effect, or evidence of acute vascular territorial infarction. Pt will be admitted to tele for syncope. pt LOC for couple minutes after cortisone injection, likely due to vasovagal vs orthostatic hypotension  EKG NSR , low voltage QRS, no ST or T wave changes. CT head negative. Cardiology recommended to dc tele. Per neurology, LOC concerning for syncope, unlikely to be neurological.  Will recommend MRI brain and EEG to r/o stroke/ seizures as patient has chronic word finding difficulty and dragging her right leg. For AURORA: r/o secondary to pre -renal azotemia ,secondary to syncope/ hypotension, less likely other cause. Con't with bronchodilators, steroids, o2 supplement as needed. Patient may need home o2, document o2 sat w/ and w/out o2, at rest and activity.  PT : DANIELLE

## 2018-04-12 NOTE — PROGRESS NOTE ADULT - SUBJECTIVE AND OBJECTIVE BOX
Time of Visit:  Patient seen and examined.     MEDICATIONS  (STANDING):  docusate sodium 100 milliGRAM(s) Oral three times a day  gabapentin 300 milliGRAM(s) Oral two times a day  heparin  Injectable 5000 Unit(s) SubCutaneous every 8 hours  hydrALAZINE 25 milliGRAM(s) Oral every 8 hours  latanoprost 0.005% Ophthalmic Solution 1 Drop(s) Both EYES at bedtime  methotrexate 15 milliGRAM(s) Oral <User Schedule>  predniSONE   Tablet 5 milliGRAM(s) Oral daily  senna 2 Tablet(s) Oral at bedtime  sertraline 150 milliGRAM(s) Oral daily  traMADol 25 milliGRAM(s) Oral two times a day      MEDICATIONS  (PRN):  ALBUTerol/ipratropium for Nebulization 3 milliLiter(s) Nebulizer every 6 hours PRN Shortness of Breath  aluminum hydroxide/magnesium hydroxide/simethicone Suspension 30 milliLiter(s) Oral every 6 hours PRN Dyspepsia       Medications up to date at time of exam.    ROS; No cough, congestion, fever, chills, SOB. Noted Hypoxia with O2 sation 86% room air resting in bed   PHYSICAL EXAMINATION:  Vital Signs Last 24 Hrs  T(C): 36.8 (12 Apr 2018 14:55), Max: 37.7 (11 Apr 2018 20:52)  T(F): 98.3 (12 Apr 2018 14:55), Max: 99.9 (11 Apr 2018 20:52)  HR: 103 (12 Apr 2018 14:55) (86 - 103)  BP: 115/87 (12 Apr 2018 14:55) (115/87 - 173/100)  BP(mean): --  RR: 18 (12 Apr 2018 14:55) (18 - 18)  SpO2: 93% (12 Apr 2018 14:55) (87% - 94%)   (if applicable)    General; Alert and oriented, forgetful. No acute distress.     HEENT: Normocephalic and atraumatic. No nasal tenderness. Vision impaired due to Macular degeneration. Moist mucosa.     NECK: Supple, no palpable adenopathy.    LUNGS: Clear to auscultation, no wheezing, rales, or rhonchi. No use of accessory muscle.     HEART: S1 S2 Regular rate and no click/ rub .    ABDOMEN: Soft, nontender, and nondistended.  No hepatosplenomegaly is noted. Active bowel sounds.     EXTREMITIES: Without any cyanosis, clubbing, rash, lesions or edema.    NEUROLOGIC: Awake, alert, oriented.     SKIN: Warm and moist. Non diaphoretic.       LABS:                        12.1   8.8   )-----------( 185      ( 10 Apr 2018 21:18 )             38.7     04-12    144  |  109<H>  |  19<H>  ----------------------------<  90  3.4<L>   |  26  |  1.06    Ca    8.7      12 Apr 2018 07:18  Phos  3.0     04-11  Mg     2.7     04-11    TPro  6.1  /  Alb  2.8<L>  /  TBili  0.3  /  DBili  x   /  AST  21  /  ALT  25  /  AlkPhos  52  04-10          CARDIAC MARKERS ( 10 Apr 2018 21:18 )  <0.015 ng/mL / x     / x     / x     / x                        RADIOLOGY & ADDITIONAL STUDIES:  EKG:   CXR: < from: Xray Chest 1 View AP/PA (04.11.18 @ 01:51) >  PROCEDURE DATE:  04/11/2018          INTERPRETATION:  Chest one view    HISTORY: COPD    COMPARISON STUDY: 9/15/2012    Frontal expiratory view of the chest shows the heart to be normal in   size. The lungs show increased basilar markings and there is no evidence   of pneumothorax nor pleural effusion.    IMPRESSION:  Mild basilar congestion. No focal infiltrate.    PAST MEDICAL & SURGICAL HISTORY:  COPD (chronic obstructive pulmonary disease)  History of cataract surgery      Impression: 89 Y/O Female with prior mentioned multiple chronic conditions . Presented with SOB, unresponsiveness from retinologist office after injection of xalantan into eye.  As per daughter Dora, patient was told she has emphysema and recently pt desat to 79% w/ physical therapy. In bed and noted O 2saturation 86% room air.     Suggestion;  Continue PRN Duoneb Q 6 hours.  Continue Prednisone 5 mg Daily.  Oxygen supplementation , need portable home Oxygen at home due to has Hypoxia resting in bed, o 2saturation 86% room air, with o2 NC at 2L O2 saturation 94%.   Needs assistance setting up her meal at home due to vision impaired , has macular degeneration as per patient . Time of Visit:  Patient seen and examined.     MEDICATIONS  (STANDING):  docusate sodium 100 milliGRAM(s) Oral three times a day  gabapentin 300 milliGRAM(s) Oral two times a day  heparin  Injectable 5000 Unit(s) SubCutaneous every 8 hours  hydrALAZINE 25 milliGRAM(s) Oral every 8 hours  latanoprost 0.005% Ophthalmic Solution 1 Drop(s) Both EYES at bedtime  methotrexate 15 milliGRAM(s) Oral <User Schedule>  predniSONE   Tablet 5 milliGRAM(s) Oral daily  senna 2 Tablet(s) Oral at bedtime  sertraline 150 milliGRAM(s) Oral daily  traMADol 25 milliGRAM(s) Oral two times a day      MEDICATIONS  (PRN):  ALBUTerol/ipratropium for Nebulization 3 milliLiter(s) Nebulizer every 6 hours PRN Shortness of Breath  aluminum hydroxide/magnesium hydroxide/simethicone Suspension 30 milliLiter(s) Oral every 6 hours PRN Dyspepsia       Medications up to date at time of exam.    ROS; No cough, congestion, fever, chills, SOB. Noted Hypoxia with O2 sation 86% room air resting in bed   PHYSICAL EXAMINATION:  Vital Signs Last 24 Hrs  T(C): 36.8 (12 Apr 2018 14:55), Max: 37.7 (11 Apr 2018 20:52)  T(F): 98.3 (12 Apr 2018 14:55), Max: 99.9 (11 Apr 2018 20:52)  HR: 103 (12 Apr 2018 14:55) (86 - 103)  BP: 115/87 (12 Apr 2018 14:55) (115/87 - 173/100)  BP(mean): --  RR: 18 (12 Apr 2018 14:55) (18 - 18)  SpO2: 93% (12 Apr 2018 14:55) (87% - 94%)   (if applicable)    General; Alert and oriented, forgetful. No acute distress.     HEENT: Normocephalic and atraumatic. No nasal tenderness. Vision impaired due to Macular degeneration. Moist mucosa.     NECK: Supple, no palpable adenopathy.    LUNGS: Clear to auscultation, no wheezing, rales, or rhonchi. No use of accessory muscle.     HEART: S1 S2 Regular rate and no click/ rub .    ABDOMEN: Soft, nontender, and nondistended.  No hepatosplenomegaly is noted. Active bowel sounds.     EXTREMITIES: Without any cyanosis, clubbing, rash, lesions or edema.    NEUROLOGIC: Awake, alert, oriented.     SKIN: Warm and moist. Non diaphoretic.       LABS:                        12.1   8.8   )-----------( 185      ( 10 Apr 2018 21:18 )             38.7     04-12    144  |  109<H>  |  19<H>  ----------------------------<  90  3.4<L>   |  26  |  1.06    Ca    8.7      12 Apr 2018 07:18  Phos  3.0     04-11  Mg     2.7     04-11    TPro  6.1  /  Alb  2.8<L>  /  TBili  0.3  /  DBili  x   /  AST  21  /  ALT  25  /  AlkPhos  52  04-10          CARDIAC MARKERS ( 10 Apr 2018 21:18 )  <0.015 ng/mL / x     / x     / x     / x                        RADIOLOGY & ADDITIONAL STUDIES:  EKG:   CXR: < from: Xray Chest 1 View AP/PA (04.11.18 @ 01:51) >  PROCEDURE DATE:  04/11/2018          INTERPRETATION:  Chest one view    HISTORY: COPD    COMPARISON STUDY: 9/15/2012    Frontal expiratory view of the chest shows the heart to be normal in   size. The lungs show increased basilar markings and there is no evidence   of pneumothorax nor pleural effusion.    IMPRESSION:  Mild basilar congestion. No focal infiltrate.    PAST MEDICAL & SURGICAL HISTORY:  COPD (chronic obstructive pulmonary disease)  History of cataract surgery      Impression: 89 Y/O Female with prior mentioned multiple chronic conditions . Presented with SOB, unresponsiveness from retinologist office after injection of xalantan into eye.  As per daughter Dora, patient was told she has emphysema and recently pt desat to 79% w/ physical therapy. In bed and noted O 2saturation 86% room air.     Suggestion;  Continue PRN Duoneb Q 6 hours.  Continue Prednisone 5 mg Daily.  Oxygen supplementation , need portable home Oxygen at home due to has Hypoxia resting in bed, o 2saturation 86% room air, with o2 NC at 2L O2 saturation 94%.   Needs assistance setting up her meal at home due to vision impaired , has macular degeneration as per patient .      Agree with above assessment and plan as transcribed.

## 2018-04-12 NOTE — PROGRESS NOTE ADULT - SUBJECTIVE AND OBJECTIVE BOX
pt seen and examined.pts current chart reviewed and case discussed with resident covering.    SUBJECTIVE:  pt feels fine and denies cp,sob,gi or gu/uremic  symptons    REVIEW OF SYSTEMS:  CONSTITUTIONAL: No weakness, fevers or chills  EYES/ENT: No visual changes;  No vertigo or throat pain   NECK: No pain or stiffness  RESPIRATORY: No cough, wheezing, hemoptysis; No shortness of breath  CARDIOVASCULAR: No chest pain or palpitations  GASTROINTESTINAL: No abdominal or epigastric pain. No nausea, vomiting, or hematemesis; No diarrhea or constipation. No melena or hematochezia.  GENITOURINARY: No dysuria, frequency , hematuria, flank pain or nocturia  NEUROLOGICAL: No numbness or weakness  SKIN: No itching, burning, rashes, or lesions   All other review of systems is negative unless indicated above    Current meds:    ALBUTerol/ipratropium for Nebulization 3 milliLiter(s) Nebulizer every 6 hours PRN  aluminum hydroxide/magnesium hydroxide/simethicone Suspension 30 milliLiter(s) Oral every 6 hours PRN  docusate sodium 100 milliGRAM(s) Oral three times a day  gabapentin 300 milliGRAM(s) Oral two times a day  heparin  Injectable 5000 Unit(s) SubCutaneous every 8 hours  hydrALAZINE 25 milliGRAM(s) Oral every 8 hours  latanoprost 0.005% Ophthalmic Solution 1 Drop(s) Both EYES at bedtime  methotrexate 15 milliGRAM(s) Oral <User Schedule>  predniSONE   Tablet 5 milliGRAM(s) Oral daily  senna 2 Tablet(s) Oral at bedtime  sertraline 150 milliGRAM(s) Oral daily  traMADol 25 milliGRAM(s) Oral two times a day      Vital Signs    T(F): 98.3 (18 @ 14:55), Max: 99.9 (18 @ 20:52)  HR: 103 (18 @ 14:55) (86 - 103)  BP: 115/87 (18 @ 14:55) (115/87 - 173/100)  ABP: --  RR: 18 (18 @ 14:55) (18 - 18)  SpO2: 93% (18 @ 14:55) (87% - 94%)  Wt(kg): --  CVP(cm H2O): --  CO: --  PCWP: --    I and O's:    Daily     Daily Weight in k.6 (2018 05:03)    PHYSICAL EXAM:  Constitutional: well developed, well nourished  and in nad  HEENT: PERRLA,  no icteric sclera and mild pallor of conjunctiva noted  Neck: No JVD, thyromegaly or adenopathy  Respiratory: reduced air entry lower lungs with no rales, wheezing or rhonchi  Cardiovascular: S1 and S2 normally heard  Gastrointestinal: soft, nondistended, nontender and normal bowel sounds heard  Extremities: No peripheral edema or cyanosis  Neurological: A/O x 3, no focal deficits  : No flank or cva tenderness palpated.  Skin: No rashes      LABS:    CBC:                          12.1   8.8   )-----------( 185      ( 10 Apr 2018 21:18 )             38.7           BMP:    0412    144  |  109<H>  |  19<H>  ----------------------------<  90  3.4<L>   |  26  |  1.06      144  |  110<H>  |  20<H>  ----------------------------<  90  4.1   |  28  |  1.04  10    138  |  106  |  26<H>  ----------------------------<  96  5.0   |  25  |  1.54<H>    Ca    8.7      2018 07:18  Ca    8.8      2018 16:04  Ca    8.3<L>      10 Apr 2018 21:18  Phos  3.0     -11  Mg     2.7     -11    TPro  6.1  /  Alb  2.8<L>  /  TBili  0.3  /  DBili  x   /  AST  21  /  ALT  25  /  AlkPhos  52  04-10          URINE STUDIES:                        RADIOLOGY & ADDITIONAL STUDIES: pt seen and examined.    SUBJECTIVE:  pt  denies cp,sob,gi or gu  symptons  pts son at bedside    REVIEW OF SYSTEMS:  CONSTITUTIONAL: No weakness, fevers or chills  EYES/ENT: No visual changes;  No vertigo or throat pain   NECK: No pain or stiffness  RESPIRATORY: No cough, wheezing, hemoptysis; No shortness of breath  CARDIOVASCULAR: No chest pain or palpitations  GASTROINTESTINAL: No abdominal or epigastric pain. No nausea, vomiting, or hematemesis; No diarrhea or constipation. No melena or hematochezia.  GENITOURINARY: No dysuria, frequency , hematuria, flank pain or nocturia  NEUROLOGICAL: No numbness or weakness  SKIN: No itching, burning, rashes, or lesions   All other review of systems is negative unless indicated above    Current meds:    ALBUTerol/ipratropium for Nebulization 3 milliLiter(s) Nebulizer every 6 hours PRN  aluminum hydroxide/magnesium hydroxide/simethicone Suspension 30 milliLiter(s) Oral every 6 hours PRN  docusate sodium 100 milliGRAM(s) Oral three times a day  gabapentin 300 milliGRAM(s) Oral two times a day  heparin  Injectable 5000 Unit(s) SubCutaneous every 8 hours  hydrALAZINE 25 milliGRAM(s) Oral every 8 hours  latanoprost 0.005% Ophthalmic Solution 1 Drop(s) Both EYES at bedtime  methotrexate 15 milliGRAM(s) Oral <User Schedule>  predniSONE   Tablet 5 milliGRAM(s) Oral daily  senna 2 Tablet(s) Oral at bedtime  sertraline 150 milliGRAM(s) Oral daily  traMADol 25 milliGRAM(s) Oral two times a day      Vital Signs    T(F): 98.3 (18 @ 14:55), Max: 99.9 (18 @ 20:52)  HR: 103 (18 @ 14:55) (86 - 103)  BP: 115/87 (18 @ 14:55) (115/87 - 173/100)  ABP: --  RR: 18 (18 @ 14:55) (18 - 18)  SpO2: 93% (18 @ 14:55) (87% - 94%)  Wt(kg): --  CVP(cm H2O): --  CO: --  PCWP: --    I and O's:    Daily     Daily Weight in k.6 (2018 05:03)    PHYSICAL EXAM:  Constitutional: well developed, well nourished  and in nad  HEENT: PERRLA,  no icteric sclera and mild pallor of conjunctiva noted  Neck: No JVD, thyromegaly or adenopathy  Respiratory: reduced air entry lower lungs with no rales, wheezing or rhonchi  Cardiovascular: S1 and S2 normally heard  Gastrointestinal: soft, nondistended, nontender and normal bowel sounds heard  Extremities: No peripheral edema or cyanosis  Neurological: A/O x 3, no focal deficits  : No flank or cva tenderness palpated.  Skin: No rashes      LABS:    CBC:                          12.1   8.8   )-----------( 185      ( 10 Apr 2018 21:18 )             38.7     BMP:    04-    144  |  109<H>  |  19<H>  ----------------------------<  90  3.4<L>   |  26  |  1.06  current egfr:36 %       144  |  110<H>  |  20<H>  ----------------------------<  90  4.1   |  28  |  1.04  10    138  |  106  |  26<H>  ----------------------------<  96  5.0   |  25  |  1.54<H>    Ca    8.7      2018 07:18  Ca    8.8      2018 16:04  Ca    8.3<L>      10 Apr 2018 21:18  Phos  3.0       Mg     2.7         TPro  6.1  /  Alb  2.8<L>  /  TBili  0.3  /  DBili  x   /  AST  21  /  ALT  25  /  AlkPhos  52  04-10

## 2018-04-12 NOTE — DISCHARGE NOTE ADULT - PATIENT PORTAL LINK FT
You can access the [facility] Patient Portal, offered by [facility], by registering with the following website: [email or web address]

## 2018-04-13 VITALS
HEART RATE: 99 BPM | RESPIRATION RATE: 18 BRPM | DIASTOLIC BLOOD PRESSURE: 64 MMHG | SYSTOLIC BLOOD PRESSURE: 97 MMHG | TEMPERATURE: 98 F | OXYGEN SATURATION: 94 %

## 2018-04-13 PROCEDURE — 93005 ELECTROCARDIOGRAM TRACING: CPT

## 2018-04-13 PROCEDURE — 84100 ASSAY OF PHOSPHORUS: CPT

## 2018-04-13 PROCEDURE — 95957 EEG DIGITAL ANALYSIS: CPT

## 2018-04-13 PROCEDURE — 84484 ASSAY OF TROPONIN QUANT: CPT

## 2018-04-13 PROCEDURE — 93880 EXTRACRANIAL BILAT STUDY: CPT

## 2018-04-13 PROCEDURE — 83735 ASSAY OF MAGNESIUM: CPT

## 2018-04-13 PROCEDURE — 95819 EEG AWAKE AND ASLEEP: CPT

## 2018-04-13 PROCEDURE — 93306 TTE W/DOPPLER COMPLETE: CPT

## 2018-04-13 PROCEDURE — 85027 COMPLETE CBC AUTOMATED: CPT

## 2018-04-13 PROCEDURE — 99285 EMERGENCY DEPT VISIT HI MDM: CPT | Mod: 25

## 2018-04-13 PROCEDURE — 80048 BASIC METABOLIC PNL TOTAL CA: CPT

## 2018-04-13 PROCEDURE — 71045 X-RAY EXAM CHEST 1 VIEW: CPT

## 2018-04-13 PROCEDURE — 70450 CT HEAD/BRAIN W/O DYE: CPT

## 2018-04-13 PROCEDURE — 96374 THER/PROPH/DIAG INJ IV PUSH: CPT

## 2018-04-13 PROCEDURE — 94640 AIRWAY INHALATION TREATMENT: CPT

## 2018-04-13 PROCEDURE — 82962 GLUCOSE BLOOD TEST: CPT

## 2018-04-13 PROCEDURE — 80053 COMPREHEN METABOLIC PANEL: CPT

## 2018-04-13 RX ADMIN — SERTRALINE 150 MILLIGRAM(S): 25 TABLET, FILM COATED ORAL at 11:50

## 2018-04-13 RX ADMIN — Medication 25 MILLIGRAM(S): at 13:29

## 2018-04-13 RX ADMIN — Medication 100 MILLIGRAM(S): at 05:28

## 2018-04-13 RX ADMIN — HEPARIN SODIUM 5000 UNIT(S): 5000 INJECTION INTRAVENOUS; SUBCUTANEOUS at 13:29

## 2018-04-13 RX ADMIN — Medication 5 MILLIGRAM(S): at 05:28

## 2018-04-13 RX ADMIN — GABAPENTIN 300 MILLIGRAM(S): 400 CAPSULE ORAL at 05:28

## 2018-04-13 RX ADMIN — Medication 25 MILLIGRAM(S): at 05:28

## 2018-04-13 RX ADMIN — Medication 100 MILLIGRAM(S): at 13:29

## 2018-04-13 RX ADMIN — HEPARIN SODIUM 5000 UNIT(S): 5000 INJECTION INTRAVENOUS; SUBCUTANEOUS at 05:28

## 2018-04-13 NOTE — PROGRESS NOTE ADULT - SUBJECTIVE AND OBJECTIVE BOX
INTERVAL HPI/OVERNIGHT EVENTS: no major overnight events     VITAL SIGNS:  Vital Signs Last 24 Hrs  T(C): 36.6 (13 Apr 2018 05:21), Max: 38 (12 Apr 2018 21:04)  T(F): 97.9 (13 Apr 2018 05:21), Max: 100.4 (12 Apr 2018 21:04)  HR: 75 (13 Apr 2018 05:21) (75 - 103)  BP: 131/30 (13 Apr 2018 05:21) (115/76 - 140/90)  BP(mean): --  RR: 18 (13 Apr 2018 05:21) (18 - 18)  SpO2: 94% (13 Apr 2018 05:21) (87% - 94%)    PHYSICAL EXAM:    Constitutional: NAD, patient comfortable  Eyes: PERRL, sclera clear   ENMT: no externa lesions   Neck: Supple, No JVD   Respiratory: CTAB   Cardiovascular: S1, S2 present, no RMG   Gastrointestinal: soft, non-tedner, non distended   Extremities: No cyanosis, edema or clubbing   Vascular: Pulses intact   Neurological: AO x 3       MEDICATIONS  (STANDING):  docusate sodium 100 milliGRAM(s) Oral three times a day  gabapentin 300 milliGRAM(s) Oral two times a day  heparin  Injectable 5000 Unit(s) SubCutaneous every 8 hours  hydrALAZINE 25 milliGRAM(s) Oral every 8 hours  latanoprost 0.005% Ophthalmic Solution 1 Drop(s) Both EYES at bedtime  methotrexate 15 milliGRAM(s) Oral <User Schedule>  potassium chloride    Tablet ER 20 milliEquivalent(s) Oral once  predniSONE   Tablet 5 milliGRAM(s) Oral daily  senna 2 Tablet(s) Oral at bedtime  sertraline 150 milliGRAM(s) Oral daily  traMADol 25 milliGRAM(s) Oral two times a day    MEDICATIONS  (PRN):  ALBUTerol/ipratropium for Nebulization 3 milliLiter(s) Nebulizer every 6 hours PRN Shortness of Breath  aluminum hydroxide/magnesium hydroxide/simethicone Suspension 30 milliLiter(s) Oral every 6 hours PRN Dyspepsia      Allergies    No Known Allergies    Intolerances        LABS:                       no labs done today           RADIOLOGY & ADDITIONAL TESTS: no new imaging tests

## 2018-04-13 NOTE — PROGRESS NOTE ADULT - SUBJECTIVE AND OBJECTIVE BOX
Time of Visit:  Patient seen and examined.     MEDICATIONS  (STANDING):  docusate sodium 100 milliGRAM(s) Oral three times a day  gabapentin 300 milliGRAM(s) Oral two times a day  heparin  Injectable 5000 Unit(s) SubCutaneous every 8 hours  hydrALAZINE 25 milliGRAM(s) Oral every 8 hours  latanoprost 0.005% Ophthalmic Solution 1 Drop(s) Both EYES at bedtime  predniSONE   Tablet 5 milliGRAM(s) Oral daily  senna 2 Tablet(s) Oral at bedtime  sertraline 150 milliGRAM(s) Oral daily  traMADol 25 milliGRAM(s) Oral two times a day      MEDICATIONS  (PRN):  acetaminophen   Tablet 650 milliGRAM(s) Oral every 6 hours PRN For Temp greater than 38 C (100.4 F)  ALBUTerol/ipratropium for Nebulization 3 milliLiter(s) Nebulizer every 6 hours PRN Shortness of Breath  aluminum hydroxide/magnesium hydroxide/simethicone Suspension 30 milliLiter(s) Oral every 6 hours PRN Dyspepsia     Medications up to date at time of exam.    PHYSICAL EXAMINATION:  Patient has no new complaints.  GENERAL: The patient is a well-developed, well-nourished, in no apparent distress.     Vital Signs Last 24 Hrs  T(C): 36.6 (13 Apr 2018 05:21), Max: 38 (12 Apr 2018 21:04)  T(F): 97.9 (13 Apr 2018 05:21), Max: 100.4 (12 Apr 2018 21:04)  HR: 75 (13 Apr 2018 05:21) (75 - 103)  BP: 131/30 (13 Apr 2018 05:21) (115/76 - 131/30)  BP(mean): --  RR: 18 (13 Apr 2018 05:21) (18 - 18)  SpO2: 94% (13 Apr 2018 05:21) (92% - 94%)   (if applicable)    Chest Tube (if applicable)    HEENT: Head is normocephalic and atraumatic.     NECK: Supple, no palpable adenopathy.    LUNGS: Clear to auscultation, no wheezing, rales, or rhonchi.    HEART: Regular rate and rhythm without murmur.    ABDOMEN: Soft, nontender, and nondistended.      EXTREMITIES: Without any cyanosis, clubbing, rash, lesions or edema.    NEUROLOGIC: Awake, alert.    SKIN: Warm, dry, good turgor.    LABS:    04-12    144  |  109<H>  |  19<H>  ----------------------------<  90  3.4<L>   |  26  |  1.06    Ca    8.7      12 Apr 2018 07:18  Phos  3.0     04-11  Mg     2.7     04-11                          MICROBIOLOGY: (if applicable)    RADIOLOGY & ADDITIONAL STUDIES:  EKG:   CXR:  ECHO:    IMPRESSION: 89y Female PAST MEDICAL & SURGICAL HISTORY:  COPD (chronic obstructive pulmonary disease)  History of cataract surgery         p/w SOB, unresponsiveness from retinologist office after injection of xalantan into eye.  As per daughter Dora, patient was told she has emphysema and recently pt desat to 79% w/ physical therapy.     + AURORA, improved    Patient clinically improved.     RECOMMENDATIONS:     - con't with bronchodilators, steroids, o2 supplement as needed.   - need portable home Oxygen at home due to has Hypoxia resting in bed   - neuro f/u    - DVT and GI prophylaxis.    - plan of care discussed with daughter dora at bedside Time of Visit:  Patient seen and examined.     MEDICATIONS  (STANDING):  docusate sodium 100 milliGRAM(s) Oral three times a day  gabapentin 300 milliGRAM(s) Oral two times a day  heparin  Injectable 5000 Unit(s) SubCutaneous every 8 hours  hydrALAZINE 25 milliGRAM(s) Oral every 8 hours  latanoprost 0.005% Ophthalmic Solution 1 Drop(s) Both EYES at bedtime  predniSONE   Tablet 5 milliGRAM(s) Oral daily  senna 2 Tablet(s) Oral at bedtime  sertraline 150 milliGRAM(s) Oral daily  traMADol 25 milliGRAM(s) Oral two times a day      MEDICATIONS  (PRN):  acetaminophen   Tablet 650 milliGRAM(s) Oral every 6 hours PRN For Temp greater than 38 C (100.4 F)  ALBUTerol/ipratropium for Nebulization 3 milliLiter(s) Nebulizer every 6 hours PRN Shortness of Breath  aluminum hydroxide/magnesium hydroxide/simethicone Suspension 30 milliLiter(s) Oral every 6 hours PRN Dyspepsia     Medications up to date at time of exam.    PHYSICAL EXAMINATION:  Patient has no new complaints.  GENERAL: The patient is a well-developed, well-nourished, in no apparent distress.     Vital Signs Last 24 Hrs  T(C): 36.6 (13 Apr 2018 05:21), Max: 38 (12 Apr 2018 21:04)  T(F): 97.9 (13 Apr 2018 05:21), Max: 100.4 (12 Apr 2018 21:04)  HR: 75 (13 Apr 2018 05:21) (75 - 103)  BP: 131/30 (13 Apr 2018 05:21) (115/76 - 131/30)  BP(mean): --  RR: 18 (13 Apr 2018 05:21) (18 - 18)  SpO2: 94% (13 Apr 2018 05:21) (92% - 94%)   (if applicable)    Chest Tube (if applicable)    HEENT: Head is normocephalic and atraumatic.     NECK: Supple, no palpable adenopathy.    LUNGS: Clear to auscultation, no wheezing, rales, or rhonchi.    HEART: Regular rate and rhythm without murmur.    ABDOMEN: Soft, nontender, and nondistended.      EXTREMITIES: Without any cyanosis, clubbing, rash, lesions or edema.    NEUROLOGIC: Awake, alert.    SKIN: Warm, dry, good turgor.    LABS:    04-12    144  |  109<H>  |  19<H>  ----------------------------<  90  3.4<L>   |  26  |  1.06    Ca    8.7      12 Apr 2018 07:18  Phos  3.0     04-11  Mg     2.7     04-11                          MICROBIOLOGY: (if applicable)    RADIOLOGY & ADDITIONAL STUDIES:  EKG:   CXR:  ECHO:    IMPRESSION: 89y Female PAST MEDICAL & SURGICAL HISTORY:  COPD (chronic obstructive pulmonary disease)  History of cataract surgery         p/w SOB, unresponsiveness from retinologist office after injection of xalantan into eye.  As per daughter Dora, patient was told she has emphysema and recently pt desat to 79% w/ physical therapy.     + AURORA, improved    Patient clinically improved.     RECOMMENDATIONS:     - con't with bronchodilators, steroids, o2 supplement as needed.   - need portable home Oxygen at home due to has Hypoxia resting in bed   - neuro f/u    - DVT and GI prophylaxis.    - plan of care discussed with daughter dora at bedside    Agree with above assessment and plan as transcribed.

## 2018-04-13 NOTE — PROGRESS NOTE ADULT - PROBLEM SELECTOR PLAN 1
- likely vasovagal response from intraocular injection   - CT head neg, EEG neg for epileptiform activity   - patient is hypoxic to 86% on room air and would benefit from home oxygen'  working on home oxygen   - continue BP medications   - continue pain medications    - since patient has 24 hr aide and wants to go home will DC home. - likely vasovagal response from intraocular injection   - CT head neg, EEG neg for epileptiform activity   - patient was found to have emphysema; acute state is resolved  - patient requires bronchodilators and oxygen at home   - patient is hypoxic to 86% on room air and would benefit from home oxygen'  working on home oxygen   - continue BP medications   - continue pain medications    - since patient has 24 hr aide and wants to go home will DC home.

## 2018-09-14 NOTE — DISCHARGE NOTE ADULT - ABILITY TO HEAR (WITH HEARING AID OR HEARING APPLIANCE IF NORMALLY USED):
Patient eating dinner        Clifton Carr RN  09/14/18 [id number]
Pt's SPO2 dropped to 88% on room air while sleeping  Pt arouseable to verbal and spontaneous stimuli   Pt placed on [address], RN  09/14/18 3882
Spoke with Dr Booker Willis, verbal order received to d/c narcan cindy Lomax, SALINA  09/14/18 [id number]
While pt ambulated to the bathroom, bed was changed and cleaned        Delaney Hutson  09/14/18 1038
Mildly to Moderately Impaired: difficulty hearing in some environments or speaker may need to increase volume or speak distinctly

## 2023-09-21 NOTE — PHYSICAL THERAPY INITIAL EVALUATION ADULT - TRANSFER SAFETY CONCERNS NOTED: SIT/STAND, REHAB EVAL
oriented to person, place and time , normal sensation , short and long term memory intact decreased safety awareness/losing balance/decreased balance during turns  Delivery

## 2024-08-28 NOTE — PATIENT PROFILE ADULT. - PAIN SCALE PREFERRED, PROFILE
----- Message from Brisa Gee sent at 8/28/2024 11:37 AM CDT -----  Contact: Lynn  Patient is calling to speak with someone regarding therapy. Patient request to inform provider of preferred physical therapy facility of [facility] located on [facility]. Reports a call back is not necessary.  Thank you,  GH   numerical [phone]